# Patient Record
Sex: FEMALE | Race: WHITE | NOT HISPANIC OR LATINO | Employment: OTHER | URBAN - METROPOLITAN AREA
[De-identification: names, ages, dates, MRNs, and addresses within clinical notes are randomized per-mention and may not be internally consistent; named-entity substitution may affect disease eponyms.]

---

## 2017-04-14 PROBLEM — M84.374A METATARSAL STRESS FRACTURE OF RIGHT FOOT: Status: ACTIVE | Noted: 2017-04-14

## 2017-04-21 DIAGNOSIS — M84.374A METATARSAL STRESS FRACTURE OF RIGHT FOOT, INITIAL ENCOUNTER: Primary | ICD-10-CM

## 2017-04-25 ENCOUNTER — HOSPITAL ENCOUNTER (OUTPATIENT)
Dept: RADIOLOGY | Facility: HOSPITAL | Age: 37
Discharge: HOME | End: 2017-04-25
Attending: ORTHOPAEDIC SURGERY
Payer: MEDICAID

## 2017-04-25 ENCOUNTER — OFFICE VISIT (OUTPATIENT)
Dept: ORTHOPEDICS | Facility: CLINIC | Age: 37
End: 2017-04-25
Payer: MEDICAID

## 2017-04-25 VITALS
WEIGHT: 170 LBS | SYSTOLIC BLOOD PRESSURE: 128 MMHG | HEART RATE: 101 BPM | DIASTOLIC BLOOD PRESSURE: 85 MMHG | BODY MASS INDEX: 27.32 KG/M2 | HEIGHT: 66 IN

## 2017-04-25 DIAGNOSIS — M84.374A METATARSAL STRESS FRACTURE OF RIGHT FOOT, INITIAL ENCOUNTER: ICD-10-CM

## 2017-04-25 DIAGNOSIS — S92.351A CLOSED DISPLACED FRACTURE OF FIFTH METATARSAL BONE OF RIGHT FOOT, INITIAL ENCOUNTER: Primary | ICD-10-CM

## 2017-04-25 DIAGNOSIS — M84.375D STRESS FRACTURE OF LEFT FOOT WITH ROUTINE HEALING, SUBSEQUENT ENCOUNTER: Primary | ICD-10-CM

## 2017-04-25 PROCEDURE — 99999 PR PBB SHADOW E&M-EST. PATIENT-LVL III: CPT | Mod: PBBFAC,,, | Performed by: ORTHOPAEDIC SURGERY

## 2017-04-25 PROCEDURE — 73630 X-RAY EXAM OF FOOT: CPT | Mod: 26,RT,, | Performed by: RADIOLOGY

## 2017-04-25 PROCEDURE — 99213 OFFICE O/P EST LOW 20 MIN: CPT | Mod: PBBFAC,PO | Performed by: ORTHOPAEDIC SURGERY

## 2017-04-25 PROCEDURE — 99204 OFFICE O/P NEW MOD 45 MIN: CPT | Mod: S$PBB,,, | Performed by: ORTHOPAEDIC SURGERY

## 2017-04-25 RX ORDER — MUPIROCIN 20 MG/G
1 OINTMENT TOPICAL
Status: CANCELLED | OUTPATIENT
Start: 2017-04-25

## 2017-04-25 NOTE — LETTER
April 25, 2017      [place]  [address] [address]  [address]  [facility]  [facility] [address]           [facility]  [address] [facility] [address] [place] [address]  Phone: [phone]          Patient: Priscilla Wall   MR Number: [id number]   YOB: 1980   Date of Visit: 4/25/2017       Dear Dr. Vincent Hyatt II:    Thank you for referring Priscilla Wall to me for evaluation. Attached you will find relevant portions of my assessment and plan of care.    If you have questions, please do not hesitate to call me. I look forward to following Priscilla Wall along with you.    Sincerely,    Cecy Nix, LPN    Enclosure  CC:  No Recipients    If you would like to receive this communication electronically, please contact [email or web address] or [phone] to request more information on [facility] Link access.    For providers and/or their staff who would like to refer a patient to [facility], please contact us through our one-stop-shop provider referral line, [facility], at [phone].    If you feel you have received this communication in error or would no longer like to receive these types of communications, please e-mail [email or web address]

## 2017-04-25 NOTE — PROGRESS NOTES
"HPI: Priscilla Wall is a 36 y.o. female who complains of right foot pain. She was referred to me by Dr. Vincent Sanchez and was seen in consultation today. The pain began acutely on 4/5/17 when she stepped on a lawn decoration and twisted her foot. She felt and heard a pop. She was seen at urgent care and has been walking in a boot with crutches. She rate her pain as 2/10 today and worse with Weight bearing. She also has h/o previous stress frx on the left foot.      PAST MEDICAL/SURGICAL/FAMILY/SOCIAL/ HISTORY: REVIEWED    ALLERGIES/MEDICATIONS: REVIEWED       Review of Systems:     Constitution: Negative.   HEENT: Negative.   Eyes: Negative.   Cardiovascular: Negative.   Respiratory: Negative.   Endocrine: Negative.   Hematologic/Lymphatic: Negative.   Skin: Negative.   Musculoskeletal: Positive for right foot pain   Gastrointestinal: Negative.   Genitourinary: Negative.   Neurological: Negative.   Psychiatric/Behavioral: Negative.   Allergic/Immunologic: Negative.       PHYSICAL EXAM:  Vitals:    04/25/17 1503   BP: 128/85   Pulse: 101     Ht Readings from Last 1 Encounters:   04/25/17 5' 6" (1.676 m)     Wt Readings from Last 1 Encounters:   04/25/17 77.1 kg (170 lb)         GENERAL: Well developed, well nourished, no acute distress.  SKIN: Skin is intact. No atrophy, abrasions or lesions are noted.   Neurological: Normal mental status. Appropriate and conversant. Alert and oriented x 3.  GAIT: Walks with crutches.    Right lower extremity compared with LLE:  2+ dorsalis pedis pulse.  Capillary refill < 3 seconds.  Normal range of motion tibiotalar and subtalar joints. Pes cavovarus.   5/5 strength EHL, FHL, tibialis anterior, gastrocsoleus, tibialis posterior and peroneals. Sensation to light touch intact sural, saphenous, superficial peroneal and deep peroneal nerves. Mild swelling of the lateral foot, no ecchymosis or deformity. No lymphadenopathy, no masses or tumors palpated.   tenderness to palpation lateral " 5th metatarsal diaphysis.       XRAYS:   3 views of right foot obtained and reviewed today reveal martinez fracture of the right foot with minimal evidence of healing.       ASSESSMENT:        Right 5th metatarsal martinez fracture    PLAN:   I spent 20 minutes in consulation with the patient today. More than half the time was spent counseling the patient on her condition and the options for operative versus non-operative care.  I agree with Dr. Sanchez and I recommend open reduction internal fixation right 5th metatarsal frx with intra-medullary screw fixation and possible bone grafting. I have explained the procedure, including indications, risks, and alternatives.  Priscilla Wall gave informed consent and is medically ready for surgery. To OR 4/28/17 [facility].

## 2017-04-26 ENCOUNTER — HOSPITAL ENCOUNTER (OUTPATIENT)
Dept: RADIOLOGY | Facility: HOSPITAL | Age: 37
Discharge: HOME | End: 2017-04-26
Attending: ORTHOPAEDIC SURGERY | Admitting: ORTHOPAEDIC SURGERY
Payer: MEDICAID

## 2017-04-26 DIAGNOSIS — M84.375D STRESS FRACTURE OF LEFT FOOT WITH ROUTINE HEALING, SUBSEQUENT ENCOUNTER: ICD-10-CM

## 2017-04-26 PROCEDURE — 77080 DXA BONE DENSITY AXIAL: CPT | Mod: 26,,, | Performed by: RADIOLOGY

## 2017-04-26 PROCEDURE — 77080 DXA BONE DENSITY AXIAL: CPT | Mod: TC,PO

## 2017-04-27 ENCOUNTER — ANESTHESIA EVENT (OUTPATIENT)
Dept: SURGERY | Facility: HOSPITAL | Age: 37
End: 2017-04-27
Payer: MEDICAID

## 2017-04-28 ENCOUNTER — SURGERY (OUTPATIENT)
Age: 37
End: 2017-04-28

## 2017-04-28 ENCOUNTER — TELEPHONE (OUTPATIENT)
Dept: ORTHOPEDICS | Facility: CLINIC | Age: 37
End: 2017-04-28

## 2017-04-28 ENCOUNTER — HOSPITAL ENCOUNTER (OUTPATIENT)
Facility: HOSPITAL | Age: 37
Discharge: HOME | End: 2017-04-28
Attending: ORTHOPAEDIC SURGERY | Admitting: ORTHOPAEDIC SURGERY
Payer: MEDICAID

## 2017-04-28 ENCOUNTER — ANESTHESIA (OUTPATIENT)
Dept: SURGERY | Facility: HOSPITAL | Age: 37
End: 2017-04-28
Payer: MEDICAID

## 2017-04-28 ENCOUNTER — HOSPITAL ENCOUNTER (OUTPATIENT)
Dept: RADIOLOGY | Facility: HOSPITAL | Age: 37
Discharge: HOME | End: 2017-04-28
Attending: ORTHOPAEDIC SURGERY | Admitting: ORTHOPAEDIC SURGERY
Payer: MEDICAID

## 2017-04-28 VITALS
OXYGEN SATURATION: 97 % | RESPIRATION RATE: 16 BRPM | TEMPERATURE: 98 F | HEIGHT: 66 IN | BODY MASS INDEX: 28.93 KG/M2 | HEART RATE: 80 BPM | DIASTOLIC BLOOD PRESSURE: 82 MMHG | SYSTOLIC BLOOD PRESSURE: 132 MMHG | WEIGHT: 180 LBS

## 2017-04-28 DIAGNOSIS — S92.351A CLOSED DISPLACED FRACTURE OF FIFTH METATARSAL BONE OF RIGHT FOOT, INITIAL ENCOUNTER: ICD-10-CM

## 2017-04-28 DIAGNOSIS — S92.351A CLOSED DISPLACED FRACTURE OF FIFTH METATARSAL BONE OF RIGHT FOOT: ICD-10-CM

## 2017-04-28 DIAGNOSIS — S92.351A DISPLACED FRACTURE OF FIFTH METATARSAL BONE OF RIGHT FOOT: Primary | ICD-10-CM

## 2017-04-28 LAB
B-HCG UR QL: NEGATIVE
CTP QC/QA: YES

## 2017-04-28 PROCEDURE — 28485 OPTX METATARSAL FX EACH: CPT | Mod: RT,,, | Performed by: ORTHOPAEDIC SURGERY

## 2017-04-28 PROCEDURE — 63600175 PHARM REV CODE 636 W HCPCS: Mod: PO | Performed by: ANESTHESIOLOGY

## 2017-04-28 PROCEDURE — D9220A PRA ANESTHESIA: Mod: CRNA,,, | Performed by: NURSE ANESTHETIST, CERTIFIED REGISTERED

## 2017-04-28 PROCEDURE — 36000708 HC OR TIME LEV III 1ST 15 MIN: Mod: PO | Performed by: ORTHOPAEDIC SURGERY

## 2017-04-28 PROCEDURE — 73620 X-RAY EXAM OF FOOT: CPT | Mod: TC,PO,RT

## 2017-04-28 PROCEDURE — 25000003 PHARM REV CODE 250: Mod: PO | Performed by: ANESTHESIOLOGY

## 2017-04-28 PROCEDURE — 71000039 HC RECOVERY, EACH ADD'L HOUR: Mod: PO | Performed by: ORTHOPAEDIC SURGERY

## 2017-04-28 PROCEDURE — 71000033 HC RECOVERY, INTIAL HOUR: Mod: PO | Performed by: ORTHOPAEDIC SURGERY

## 2017-04-28 PROCEDURE — 63600175 PHARM REV CODE 636 W HCPCS: Mod: PO | Performed by: NURSE ANESTHETIST, CERTIFIED REGISTERED

## 2017-04-28 PROCEDURE — 37000008 HC ANESTHESIA 1ST 15 MINUTES: Mod: PO | Performed by: ORTHOPAEDIC SURGERY

## 2017-04-28 PROCEDURE — 37000009 HC ANESTHESIA EA ADD 15 MINS: Mod: PO | Performed by: ORTHOPAEDIC SURGERY

## 2017-04-28 PROCEDURE — 25000003 PHARM REV CODE 250: Mod: PO | Performed by: ORTHOPAEDIC SURGERY

## 2017-04-28 PROCEDURE — 76000 FLUOROSCOPY <1 HR PHYS/QHP: CPT | Mod: TC,PO

## 2017-04-28 PROCEDURE — 76000 FLUOROSCOPY <1 HR PHYS/QHP: CPT | Mod: 26,,, | Performed by: RADIOLOGY

## 2017-04-28 PROCEDURE — 73620 X-RAY EXAM OF FOOT: CPT | Mod: 26,RT,, | Performed by: RADIOLOGY

## 2017-04-28 PROCEDURE — C1713 ANCHOR/SCREW BN/BN,TIS/BN: HCPCS | Mod: PO | Performed by: ORTHOPAEDIC SURGERY

## 2017-04-28 PROCEDURE — 81025 URINE PREGNANCY TEST: CPT | Mod: PO | Performed by: ORTHOPAEDIC SURGERY

## 2017-04-28 PROCEDURE — 25000003 PHARM REV CODE 250: Mod: PO | Performed by: NURSE ANESTHETIST, CERTIFIED REGISTERED

## 2017-04-28 PROCEDURE — D9220A PRA ANESTHESIA: Mod: ANES,,, | Performed by: ANESTHESIOLOGY

## 2017-04-28 PROCEDURE — 27201423 OPTIME MED/SURG SUP & DEVICES STERILE SUPPLY: Mod: PO | Performed by: ORTHOPAEDIC SURGERY

## 2017-04-28 PROCEDURE — 36000709 HC OR TIME LEV III EA ADD 15 MIN: Mod: PO | Performed by: ORTHOPAEDIC SURGERY

## 2017-04-28 PROCEDURE — 63600175 PHARM REV CODE 636 W HCPCS: Mod: PO | Performed by: ORTHOPAEDIC SURGERY

## 2017-04-28 RX ORDER — LIDOCAINE HYDROCHLORIDE 10 MG/ML
1 INJECTION, SOLUTION EPIDURAL; INFILTRATION; INTRACAUDAL; PERINEURAL ONCE
Status: DISCONTINUED | OUTPATIENT
Start: 2017-04-28 | End: 2017-04-28 | Stop reason: HOSPADM

## 2017-04-28 RX ORDER — KETAMINE HYDROCHLORIDE 100 MG/ML
INJECTION, SOLUTION INTRAMUSCULAR; INTRAVENOUS
Status: DISCONTINUED | OUTPATIENT
Start: 2017-04-28 | End: 2017-04-28

## 2017-04-28 RX ORDER — SODIUM CHLORIDE, SODIUM LACTATE, POTASSIUM CHLORIDE, CALCIUM CHLORIDE 600; 310; 30; 20 MG/100ML; MG/100ML; MG/100ML; MG/100ML
INJECTION, SOLUTION INTRAVENOUS CONTINUOUS
Status: DISCONTINUED | OUTPATIENT
Start: 2017-04-28 | End: 2017-04-28 | Stop reason: HOSPADM

## 2017-04-28 RX ORDER — BUPIVACAINE HYDROCHLORIDE 5 MG/ML
INJECTION, SOLUTION EPIDURAL; INTRACAUDAL
Status: DISCONTINUED | OUTPATIENT
Start: 2017-04-28 | End: 2017-04-28 | Stop reason: HOSPADM

## 2017-04-28 RX ORDER — OXYCODONE AND ACETAMINOPHEN 10; 325 MG/1; MG/1
1 TABLET ORAL EVERY 4 HOURS PRN
Qty: 75 TABLET | Refills: 0 | Status: SHIPPED | OUTPATIENT
Start: 2017-04-28 | End: 2017-06-12 | Stop reason: SDUPTHER

## 2017-04-28 RX ORDER — ONDANSETRON 2 MG/ML
INJECTION INTRAMUSCULAR; INTRAVENOUS
Status: DISCONTINUED | OUTPATIENT
Start: 2017-04-28 | End: 2017-04-28

## 2017-04-28 RX ORDER — GLYCOPYRROLATE 0.2 MG/ML
INJECTION INTRAMUSCULAR; INTRAVENOUS
Status: DISCONTINUED | OUTPATIENT
Start: 2017-04-28 | End: 2017-04-28

## 2017-04-28 RX ORDER — LIDOCAINE HYDROCHLORIDE 10 MG/ML
INJECTION, SOLUTION EPIDURAL; INFILTRATION; INTRACAUDAL; PERINEURAL
Status: DISCONTINUED | OUTPATIENT
Start: 2017-04-28 | End: 2017-04-28 | Stop reason: HOSPADM

## 2017-04-28 RX ORDER — FENTANYL CITRATE 50 UG/ML
25 INJECTION, SOLUTION INTRAMUSCULAR; INTRAVENOUS EVERY 5 MIN PRN
Status: COMPLETED | OUTPATIENT
Start: 2017-04-28 | End: 2017-04-28

## 2017-04-28 RX ORDER — OXYCODONE HYDROCHLORIDE 5 MG/1
5 TABLET ORAL ONCE AS NEEDED
Status: COMPLETED | OUTPATIENT
Start: 2017-04-28 | End: 2017-04-28

## 2017-04-28 RX ORDER — MUPIROCIN 20 MG/G
1 OINTMENT TOPICAL
Status: COMPLETED | OUTPATIENT
Start: 2017-04-28 | End: 2017-04-28

## 2017-04-28 RX ORDER — SODIUM CHLORIDE 0.9 % (FLUSH) 0.9 %
3 SYRINGE (ML) INJECTION
Status: DISCONTINUED | OUTPATIENT
Start: 2017-04-28 | End: 2017-04-28 | Stop reason: HOSPADM

## 2017-04-28 RX ORDER — ONDANSETRON 4 MG/1
8 TABLET, ORALLY DISINTEGRATING ORAL EVERY 8 HOURS PRN
Qty: 20 TABLET | Refills: 1 | Status: ON HOLD | OUTPATIENT
Start: 2017-04-28 | End: 2017-05-31

## 2017-04-28 RX ORDER — PROPOFOL 10 MG/ML
VIAL (ML) INTRAVENOUS
Status: DISCONTINUED | OUTPATIENT
Start: 2017-04-28 | End: 2017-04-28

## 2017-04-28 RX ORDER — MIDAZOLAM HYDROCHLORIDE 1 MG/ML
INJECTION, SOLUTION INTRAMUSCULAR; INTRAVENOUS
Status: DISCONTINUED | OUTPATIENT
Start: 2017-04-28 | End: 2017-04-28

## 2017-04-28 RX ORDER — FENTANYL CITRATE 50 UG/ML
INJECTION, SOLUTION INTRAMUSCULAR; INTRAVENOUS
Status: DISCONTINUED | OUTPATIENT
Start: 2017-04-28 | End: 2017-04-28

## 2017-04-28 RX ORDER — KETOROLAC TROMETHAMINE 30 MG/ML
INJECTION, SOLUTION INTRAMUSCULAR; INTRAVENOUS
Status: DISCONTINUED | OUTPATIENT
Start: 2017-04-28 | End: 2017-04-28

## 2017-04-28 RX ORDER — DEXAMETHASONE SODIUM PHOSPHATE 4 MG/ML
8 INJECTION, SOLUTION INTRA-ARTICULAR; INTRALESIONAL; INTRAMUSCULAR; INTRAVENOUS; SOFT TISSUE
Status: COMPLETED | OUTPATIENT
Start: 2017-04-28 | End: 2017-04-28

## 2017-04-28 RX ORDER — LIDOCAINE HCL/PF 100 MG/5ML
SYRINGE (ML) INTRAVENOUS
Status: DISCONTINUED | OUTPATIENT
Start: 2017-04-28 | End: 2017-04-28

## 2017-04-28 RX ORDER — ACETAMINOPHEN 10 MG/ML
INJECTION, SOLUTION INTRAVENOUS
Status: DISCONTINUED | OUTPATIENT
Start: 2017-04-28 | End: 2017-04-28

## 2017-04-28 RX ADMIN — ONDANSETRON 4 MG: 2 INJECTION, SOLUTION INTRAMUSCULAR; INTRAVENOUS at 10:04

## 2017-04-28 RX ADMIN — FENTANYL CITRATE 25 MCG: 50 INJECTION INTRAMUSCULAR; INTRAVENOUS at 12:04

## 2017-04-28 RX ADMIN — SODIUM CHLORIDE, SODIUM LACTATE, POTASSIUM CHLORIDE, AND CALCIUM CHLORIDE: .6; .31; .03; .02 INJECTION, SOLUTION INTRAVENOUS at 09:04

## 2017-04-28 RX ADMIN — FENTANYL CITRATE 25 MCG: 50 INJECTION INTRAMUSCULAR; INTRAVENOUS at 01:04

## 2017-04-28 RX ADMIN — LIDOCAINE HYDROCHLORIDE 75 MG: 20 INJECTION PARENTERAL at 10:04

## 2017-04-28 RX ADMIN — PROPOFOL 40 MG: 10 INJECTION, EMULSION INTRAVENOUS at 10:04

## 2017-04-28 RX ADMIN — KETAMINE HYDROCHLORIDE 25 MG: 100 INJECTION, SOLUTION, CONCENTRATE INTRAMUSCULAR; INTRAVENOUS at 10:04

## 2017-04-28 RX ADMIN — PROPOFOL 40 MG: 10 INJECTION, EMULSION INTRAVENOUS at 11:04

## 2017-04-28 RX ADMIN — MUPIROCIN 1 G: 20 OINTMENT TOPICAL at 09:04

## 2017-04-28 RX ADMIN — FENTANYL CITRATE 50 MCG: 50 INJECTION, SOLUTION INTRAMUSCULAR; INTRAVENOUS at 11:04

## 2017-04-28 RX ADMIN — DEXAMETHASONE SODIUM PHOSPHATE 8 MG: 4 INJECTION, SOLUTION INTRAMUSCULAR; INTRAVENOUS at 09:04

## 2017-04-28 RX ADMIN — FENTANYL CITRATE 50 MCG: 50 INJECTION, SOLUTION INTRAMUSCULAR; INTRAVENOUS at 10:04

## 2017-04-28 RX ADMIN — KETOROLAC TROMETHAMINE 30 MG: 30 INJECTION, SOLUTION INTRAMUSCULAR; INTRAVENOUS at 10:04

## 2017-04-28 RX ADMIN — BUPIVACAINE HYDROCHLORIDE 30 ML: 5 INJECTION, SOLUTION EPIDURAL; INTRACAUDAL; PERINEURAL at 11:04

## 2017-04-28 RX ADMIN — MIDAZOLAM HYDROCHLORIDE 2 MG: 1 INJECTION, SOLUTION INTRAMUSCULAR; INTRAVENOUS at 10:04

## 2017-04-28 RX ADMIN — LIDOCAINE HYDROCHLORIDE 20 ML: 10 INJECTION, SOLUTION EPIDURAL; INFILTRATION; INTRACAUDAL; PERINEURAL at 11:04

## 2017-04-28 RX ADMIN — OXYCODONE HYDROCHLORIDE 5 MG: 5 TABLET ORAL at 12:04

## 2017-04-28 RX ADMIN — GLYCOPYRROLATE 0.2 MG: 0.2 INJECTION, SOLUTION INTRAMUSCULAR; INTRAVENOUS at 10:04

## 2017-04-28 RX ADMIN — DEXTROSE 2 G: 50 INJECTION, SOLUTION INTRAVENOUS at 10:04

## 2017-04-28 RX ADMIN — SODIUM CHLORIDE, SODIUM LACTATE, POTASSIUM CHLORIDE, AND CALCIUM CHLORIDE: .6; .31; .03; .02 INJECTION, SOLUTION INTRAVENOUS at 12:04

## 2017-04-28 RX ADMIN — PROPOFOL 200 MG: 10 INJECTION, EMULSION INTRAVENOUS at 10:04

## 2017-04-28 RX ADMIN — ACETAMINOPHEN 1000 MG: 10 INJECTION, SOLUTION INTRAVENOUS at 10:04

## 2017-04-28 NOTE — OP NOTE
OPERATIVE NOTE       DATE: 4/12/2017  TIME: 12:03 PM       PATIENT NAME: Anaya Fuller      PRE-OPERATIVE DIAGNOSIS: Right 5th metatarsal martinez fracture       POST-OPERATIVE DIAGNOSIS: Right 5th metatarsal martinez fracture      PROCEDURE: Open reduction internal fixation Right 5th metatarsal martinez fracture       ANESTHESIA TYPE: LMA and ankle block      SPECIMENS SENT: None       COMPLICATIONS: None       BLOOD LOSS: <5 cc       SURGEON: Richard Osborn MD       ASSISTANT: SOLO Obregon      Procedure in detail: After appropriate informed consent was obtained the patient was taken to the OR and placed in the lazy lateral position on the bean bag. The Right lower extremity was prepped and draped in the usual sterile fashion. A guidewire for the Arthrex martinez fracture set was placed percutaneously. There was good position of the wire within the intramedullary canal of the metatarsal under fluoro. However the medial cortex was sclerotic and the screw was going out the lateral cortex therefore a guidewire for the [employer]ynthes 4.5 cannulated screws was used and appropriated position within the canal was confirmed.  Next a 2 -cm incision was made at the base of the 5th metatarsal in line with the guidewire using a #15 blade overlying the metatarsal.. The depth was measured and the guidewire was overdrilled. Next a 50 mm 4.5 cannulated screw with a washer was placed over the guidewire with good reduction and compression of the fracture. There was good reduction and compression of the fracture on AP/Lateral/Oblique views of the foot under fluoroscopy.       The incision was irrigated with normal saline. The skin was re-approximated using 3.0 nylon interrupted suture. Sterile dressing using xeroform, bacitracin, 4x8s, cast padding, posterior splint and stirrups  wrap were placed. Patient tolerated the procedure well without complications. I was present and scrubbed for the entire case.

## 2017-04-28 NOTE — DISCHARGE SUMMARY
"[facility]  Discharge Note  Short Stay    Admit Date: 4/28/2017    Discharge Date and Time:  4/28/2017 11:57 AM     Attending Physician: Richard Osborn MD     Discharge Provider: Richard Osborn    Diagnoses:  Active Hospital Problems    Diagnosis  POA    *Displaced fracture of fifth metatarsal bone of right foot [S92.351A]  Yes      Resolved Hospital Problems    Diagnosis Date Resolved POA   No resolved problems to display.       Discharged Condition: good    Hospital Course: Patient was admitted for an outpatient procedure and tolerated the procedure well with no complications.    Final Diagnoses: Same as principal problem.    Disposition: Home or Self Care    Follow up/Patient Instructions:    Medications:  Reconciled Home Medications:   Current Discharge Medication List      START taking these medications    Details   ondansetron (ZOFRAN-ODT) 4 MG TbDL Take 2 tablets (8 mg total) by mouth every 8 (eight) hours as needed.  Qty: 20 tablet, Refills: 1      oxycodone-acetaminophen (PERCOCET)  mg per tablet Take 1 tablet by mouth every 4 (four) hours as needed for Pain.  Qty: 75 tablet, Refills: 0         CONTINUE these medications which have NOT CHANGED    Details   citalopram (CELEXA) 40 MG tablet TK 1 T PO Q NIGHT  Refills: 2      hydrocodone-acetaminophen 5-325mg (NORCO) 5-325 mg per tablet Take 1 tablet by mouth every 8 (eight) hours as needed for Pain.  Qty: 20 tablet, Refills: 0    Associated Diagnoses: Metatarsal stress fracture of right foot, initial encounter      oxycodone-acetaminophen (PERCOCET) 5-325 mg per tablet TK 1 T PO TID PRN P.  Refills: 0      VITAMIN B COMPLEX (VITAMINS B COMPLEX ORAL) Take 1 tablet by mouth once daily.         STOP taking these medications       ibuprofen (ADVIL,MOTRIN) 800 MG tablet Comments:   Reason for Stopping:               Discharge Procedure Orders  CRUTCHES FOR HOME USE   Order Specific Question Answer Comments   Type: Axillary    Height: 5' 6" " "(1.676 m)    Weight: 81.6 kg (180 lb)    Length of need (1-99 months): 3      Diet general     Call MD for:  temperature >100.4     Call MD for:  persistent nausea and vomiting     Call MD for:  severe uncontrolled pain     Call MD for:  difficulty breathing, headache or visual disturbances     Call MD for:  redness, tenderness, or signs of infection (pain, swelling, redness, odor or green/yellow discharge around incision site)     Call MD for:  hives     Call MD for:  persistent dizziness or light-headedness     Call MD for:  extreme fatigue     Keep surgical extremity elevated     No driving, operating heavy equipment or signing legal documents while taking pain medication     Non weight bearing     Leave dressing on - Keep it clean, dry, and intact until clinic visit       Follow-up Information     Follow up with Richard Osborn MD On 5/9/2017.    Specialty:  Orthopedic Surgery    Contact information:    [address] [facility] [address] [place] [address]  [phone]            Discharge Procedure Orders (must include Diet, Follow-up, Activity):    Discharge Procedure Orders (must include Diet, Follow-up, Activity)  CRUTCHES FOR HOME USE   Order Specific Question Answer Comments   Type: Axillary    Height: 5' 6" (1.676 m)    Weight: 81.6 kg (180 lb)    Length of need (1-99 months): 3      Diet general     Call MD for:  temperature >100.4     Call MD for:  persistent nausea and vomiting     Call MD for:  severe uncontrolled pain     Call MD for:  difficulty breathing, headache or visual disturbances     Call MD for:  redness, tenderness, or signs of infection (pain, swelling, redness, odor or green/yellow discharge around incision site)     Call MD for:  hives     Call MD for:  persistent dizziness or light-headedness     Call MD for:  extreme fatigue     Keep surgical extremity elevated     No driving, operating heavy equipment or signing legal documents while taking pain medication     Non weight bearing     Leave " dressing on - Keep it clean, dry, and intact until clinic visit

## 2017-04-28 NOTE — TELEPHONE ENCOUNTER
----- Message from Reuben Pineda sent at 4/28/2017  3:36 PM CDT -----  Contact: self [phone]  Pt states that she is having issues filling her pain medication after surgery today/ she is needing a physician to override this at the pharmacy/ please call thanks

## 2017-04-28 NOTE — TRANSFER OF CARE
"Anesthesia Transfer of Care Note    Patient: Priscilla Wall    Procedure(s) Performed: Procedure(s) (LRB):  OPEN REDUCTION INTERNAL FIXATION-METATARSALS (Right)    Patient location: PACU    Anesthesia Type: general    Transport from OR: Transported from OR on room air with adequate spontaneous ventilation    Post pain: adequate analgesia    Post assessment: no apparent anesthetic complications and tolerated procedure well    Post vital signs: stable    Level of consciousness: sedated    Nausea/Vomiting: no nausea/vomiting    Complications: none          Last vitals:   Visit Vitals    /72 (BP Location: Right arm, Patient Position: Lying, BP Method: Automatic)    Pulse 87    Temp 36.7 °C (98.1 °F) (Temporal)    Resp 16    Ht 5' 6" (1.676 m)    Wt 81.6 kg (180 lb)    LMP 04/14/2017    SpO2 96%    Breastfeeding No    BMI 29.05 kg/m2     "

## 2017-04-28 NOTE — OR NURSING
Pt notified that procedure will be delayed due to previous surgeon's case running 45-60 minutes late. Pt informed that she will be updated if any further delays occur. Pt states complete understanding.

## 2017-04-28 NOTE — TELEPHONE ENCOUNTER
Spoke to patient. Pharmacy needs PA to dispense medication. Called pharmacy. Pharmacist states that the patient's insurance only allows patient's to have 2 prescriptions for narcotics a month. Patient has had 2 already this month. Therefore, insurance company will need to be contacted to initiate PA. In the mean time patient can pay cash price of $170 and attempt to be reimbursed for the medication. Called patient back to notify of this. Patient has pain medication left over from last prescription fill and will use this through the weekend until we get authorization for pain medication. Call placed to insurance company to initiate PA. Company is faxing over forms to be filled out.

## 2017-04-28 NOTE — BRIEF OP NOTE
OPERATIVE NOTE       DATE: 4/12/2017  TIME: 12:02 PM       PATIENT NAME: Anaya Fuller      PRE-OPERATIVE DIAGNOSIS: Right 5th metatarsal martinez fracture       POST-OPERATIVE DIAGNOSIS: Right 5th metatarsal martinez fracture      PROCEDURE: Open reduction internal fixation Right 5th metatarsal martinez fracture       ANESTHESIA TYPE: LMA and ankle block      SPECIMENS SENT: None       COMPLICATIONS: None       BLOOD LOSS: <5 cc       SURGEON: Richard Osborn MD       ASSISTANT: SOLO Obregon

## 2017-04-28 NOTE — DISCHARGE INSTRUCTIONS
FOOT SURGERY  After surgery:    DOS:   Keep leg elevated until first post operative visit   Keep dressing clean and dry DO NOT CHANGE BANDAGES   Advanced diet as tolerated.    Check circulation frequently in toes by pressing down on toenail. Nail should turn white and then pink when released.   Resume home medications    DONT:   Do not remove your dressing   Do not get dressing wet.   No driving until released by MD   DO NOT TAKE ADDITIONAL TYLENOL/ACETAMINOPHEN WHILE TAKING NARCOTIC PAIN MEDICATION THAT CONTAINS TYLENOL/ACETAMINOPHEN.    CALL PHYSICIAN FOR:   Pain, burning, or numbness of the toes not relieved by elevation of the leg.   Pale or cold toes; bluish nail beds.   Redness, swelling, or bleeding.   Fever> 101   Drainage (pus) from the puncture sites   Pain unrelieved by pain medication    FOR EMERGENCIES CONTACT YOUR PHYSICIAN -[phone]      Discharge Instructions: Using Crutches (Non-Weight-Bearing)  Your healthcare provider has prescribed crutches for you. A healthy leg can support your body weight, but when you have an injured leg or foot, you need to keep weight off it. The swing to method of walking, sometimes called gait, is easy to learn and takes less arm strength and balance. The swing through gait takes more practice, but it moves you farther with each step and is less tiring overall. Start with swing to and progress to swing through when instructed.      Before you use crutches  Be prepared:  · Remove throw rugs, electrical cords, and anything else that may cause you to fall.  · Arrange your household to keep the items you need handy. Keep everything else out of the way.  · Find a backpack, cammy pack, or apron, or use pockets to carry things. This will help you keep your hands free.  Standing with crutches  Use the balanced standing (tripod) position when you start or end a movement. Also use it whenever youre standing for a length of time.  · Move your  crutches in front of you about 12 inches.  · Hold the injured (or weaker) foot off the floor.  · Find your balance.  · Be sure not to rest your armpits on the pads of the crutches.  Walking with crutches  Tips include the following:   · Start in a balanced standing (tripod) position.  · Squeeze the crutch pads against the sides of your chest.  · The bottom tips of the crutches should be wide enough apart for you to move easily between them.  · Support your weight on your hands, not on your armpits.  Swing to gait  · With your crutches in front of you, press down on the handgrips.  · Lift your good (stronger) foot and swing your body up to the crutches.  · Land on your good foot, between your crutches.  · Keep the knee of your injured or weaker foot slightly bent.  · Reach forward and out with the crutches to begin the next step.  Swing through gait  · With your crutches in front of you, press down on the handgrips.  · Lift your good (stronger) foot and swing your body through the crutches.  · Land on your good foot, about 12 inches in front of the crutches.  · Keep the knee of your injured leg slightly bent.  · Reach forward and out with the crutches to begin the next step.  Follow-up  Make a follow-up appointment as directed by your healthcare provider.     When to call your healthcare provider  Call your healthcare provider right away if you have any of the following:  · Sudden or increased shortness of breath  · Sudden chest pain  · Fever above 100.4°F (38.0°C)  · Increasing redness, tenderness, or swelling at the incision site or in the injured limb  · Drainage from the incision or injured limb  · Opening of the incision or injury  · Localized chest pain with coughing   Date Last Reviewed: 8/1/2016  © 1430-4806 [employer]. [address] [place] [address], [place] [address]. All rights reserved. This information is not intended as a substitute for professional medical care. Always follow your healthcare  professional's instructions.      Discharge Instructions: After Your Surgery  Youve just had surgery. During surgery you were given medicine called anesthesia to keep you relaxed and free of pain. After surgery you may have some pain or nausea. This is common. Here are some tips for feeling better and getting well after surgery.     Stay on schedule with your medication.   Going home  Your doctor or nurse will show you how to take care of yourself when you go home. He or she will also answer your questions. Have an adult family member or friend drive you home. For the first 24 hours after your surgery:  · Do not drive or use heavy equipment.  · Do not make important decisions or sign legal papers.  · Do not drink alcohol.  · Have someone stay with you, if needed. He or she can watch for problems and help keep you safe.  Be sure to go to all follow-up visits with your doctor. And rest after your surgery for as long as your doctor tells you to.  Coping with pain  If you have pain after surgery, pain medicine will help you feel better. Take it as told, before pain becomes severe. Also, ask your doctor or pharmacist about other ways to control pain. This might be with heat, ice, or relaxation. And follow any other instructions your surgeon or nurse gives you.  Tips for taking pain medicine  To get the best relief possible, remember these points:  · Pain medicines can upset your stomach. Taking them with a little food may help.  · Most pain relievers taken by mouth need at least 20 to 30 minutes to start to work.  · Taking medicine on a schedule can help you remember to take it. Try to time your medicine so that you can take it before starting an activity. This might be before you get dressed, go for a walk, or sit down for dinner.  · Constipation is a common side effect of pain medicines. Call your doctor before taking any medicines such as laxatives or stool softeners to help ease constipation. Also ask if you should  skip any foods. Drinking lots of fluids and eating foods such as fruits and vegetables that are high in fiber can also help. Remember, do not take laxatives unless your surgeon has prescribed them.  · Drinking alcohol and taking pain medicine can cause dizziness and slow your breathing. It can even be deadly. Do not drink alcohol while taking pain medicine.  · Pain medicine can make you react more slowly to things. Do not drive or run machinery while taking pain medicine.  Your health care provider may tell you to take acetaminophen to help ease your pain. Ask him or her how much you are supposed to take each day. Acetaminophen or other pain relievers may interact with your prescription medicines or other over-the-counter (OTC) drugs. Some prescription medicines have acetaminophen and other ingredients. Using both prescription and OTC acetaminophen for pain can cause you to overdose. Read the labels on your OTC medicines with care. This will help you to clearly know the list of ingredients, how much to take, and any warnings. It may also help you not take too much acetaminophen. If you have questions or do not understand the information, ask your pharmacist or health care provider to explain it to you before you take the OTC medicine.  Managing nausea  Some people have an upset stomach after surgery. This is often because of anesthesia, pain, or pain medicine, or the stress of surgery. These tips will help you handle nausea and eat healthy foods as you get better. If you were on a special food plan before surgery, ask your doctor if you should follow it while you get better. These tips may help:  · Do not push yourself to eat. Your body will tell you when to eat and how much.  · Start off with clear liquids and soup. They are easier to digest.  · Next try semi-solid foods, such as mashed potatoes, applesauce, and gelatin, as you feel ready.  · Slowly move to solid foods. Dont eat fatty, rich, or spicy foods at  first.  · Do not force yourself to have 3 large meals a day. Instead eat smaller amounts more often.  · Take pain medicines with a small amount of solid food, such as crackers or toast, to avoid nausea.     Call your surgeon if  · You still have pain an hour after taking medicine. The medicine may not be strong enough.  · You feel too sleepy, dizzy, or groggy. The medicine may be too strong.  · You have side effects like nausea, vomiting, or skin changes, such as rash, itching, or hives.       If you have obstructive sleep apnea  You were given anesthesia medicine during surgery to keep you comfortable and free of pain. After surgery, you may have more apnea spells because of this medicine and other medicines you were given. The spells may last longer than usual.   At home:  · Keep using the continuous positive airway pressure (CPAP) device when you sleep. Unless your health care provider tells you not to, use it when you sleep, day or night. CPAP is a common device used to treat obstructive sleep apnea.  · Talk with your provider before taking any pain medicine, muscle relaxants, or sedatives. Your provider will tell you about the possible dangers of taking these medicines.  Date Last Reviewed: 10/16/2014  © 5320-1836 [employer]. [address] [place] [address], [place] [address]. All rights reserved. This information is not intended as a substitute for professional medical care. Always follow your healthcare professional's instructions.

## 2017-04-28 NOTE — PLAN OF CARE
Problem: Surgery Nonspecified (Adult)  Goal: Signs and Symptoms of Listed Potential Problems Will be Absent, Minimized or Managed (Surgery Nonspecified)  Signs and symptoms of listed potential problems will be absent, minimized or managed by discharge/transition of care (reference Surgery Nonspecified (Adult) CPG).   Outcome: Outcome(s) achieved Date Met:  04/28/17  Vss, tootie po fluids, denies pain, ambulates easily.  States ready to go home.  Discharged from facility with family.

## 2017-04-28 NOTE — ANESTHESIA POSTPROCEDURE EVALUATION
"Anesthesia Post Evaluation    Patient: Priscilla Wall    Procedure(s) Performed: Procedure(s) (LRB):  OPEN REDUCTION INTERNAL FIXATION-METATARSALS (Right)    Final Anesthesia Type: general  Patient location during evaluation: PACU  Patient participation: Yes- Able to Participate  Level of consciousness: awake and alert and oriented  Post-procedure vital signs: reviewed and stable  Pain management: adequate  Airway patency: patent  PONV status at discharge: No PONV  Anesthetic complications: no      Cardiovascular status: hemodynamically stable  Respiratory status: unassisted, spontaneous ventilation and room air  Hydration status: euvolemic  Follow-up not needed.        Visit Vitals    /82    Pulse 80    Temp 36.7 °C (98.1 °F) (Skin)    Resp 16    Ht 5' 6" (1.676 m)    Wt 81.6 kg (180 lb)    LMP 04/14/2017    SpO2 97%    Breastfeeding No    BMI 29.05 kg/m2       Pain/Blayne Score: Pain Assessment Performed: Yes (4/28/2017 12:54 PM)  Presence of Pain: complains of pain/discomfort (4/28/2017 12:54 PM)  Pain Rating Prior to Med Admin: 8 (4/28/2017  1:16 PM)  Pain Rating Post Med Admin: 7 (4/28/2017  1:11 PM)  Blayne Score: 10 (4/28/2017 12:54 PM)      "

## 2017-04-28 NOTE — IP AVS SNAPSHOT
[facility]  [address]  [place] [address]  Phone: [phone]           Patient Discharge Instructions   Our goal is to set you up for success. This packet includes information on your condition, medications, and your home care.  It will help you care for yourself to prevent having to return to the hospital.     Please ask your nurse if you have any questions.      There are many details to remember when preparing to leave the hospital. Here is what you will need to do:    1. Take your medicine. If you are prescribed medications, review your Medication List on the following pages. You may have new medications to  at the pharmacy and others that you'll need to stop taking. Review the instructions for how and when to take your medications. Talk with your doctor or nurses if you are unsure of what to do.     2. Go to your follow-up appointments. Specific follow-up information is listed in the following pages. Your may be contacted by a nurse or clinical provider about future appointments. Be sure we have all of the phone numbers to reach you. Please contact your provider's office if you are unable to make an appointment.     3. Watch for warning signs. Your doctor or nurse will give you detailed warning signs to watch for and when to call for assistance. These instructions may also include educational information about your condition. If you experience any of warning signs to your health, call your doctor.           [facility]  Unless otherwise directed by your provider, please   contact [facility], our nurse care line   that is available for 24/7 assistance.     [phone] (toll-free)     Registered nurses in the [facility]   provide: appointment scheduling, clinical advisement, health education, and other advisory services.                  ** Verify the list of medication(s) below is accurate and up to date. Carry this with you in case of  emergency. If your medications have changed, please notify your healthcare provider.             Medication List      START taking these medications        Additional Info                      ondansetron 4 MG Tbdl   Commonly known as:  ZOFRAN-ODT   Quantity:  20 tablet   Refills:  1   Dose:  8 mg    Instructions:  Take 2 tablets (8 mg total) by mouth every 8 (eight) hours as needed.     Begin Date    AM    Noon    PM    Bedtime         CHANGE how you take these medications        Additional Info                      * oxycodone-acetaminophen 5-325 mg per tablet   Commonly known as:  PERCOCET   Refills:  0   What changed:  Another medication with the same name was added. Make sure you understand how and when to take each.    Instructions:  TK 1 T PO TID PRN P.     Begin Date    AM    Noon    PM    Bedtime       * oxycodone-acetaminophen  mg per tablet   Commonly known as:  PERCOCET   Quantity:  75 tablet   Refills:  0   Dose:  1 tablet   What changed:  You were already taking a medication with the same name, and this prescription was added. Make sure you understand how and when to take each.    Instructions:  Take 1 tablet by mouth every 4 (four) hours as needed for Pain.     Begin Date    AM    Noon    PM    Bedtime       * Notice:  This list has 2 medication(s) that are the same as other medications prescribed for you. Read the directions carefully, and ask your doctor or other care provider to review them with you.      CONTINUE taking these medications        Additional Info                      citalopram 40 MG tablet   Commonly known as:  CELEXA   Refills:  2    Instructions:  TK 1 T PO Q NIGHT     Begin Date    AM    Noon    PM    Bedtime       hydrocodone-acetaminophen 5-325mg 5-325 mg per tablet   Commonly known as:  NORCO   Quantity:  20 tablet   Refills:  0   Dose:  1 tablet    Instructions:  Take 1 tablet by mouth every 8 (eight) hours as needed for Pain.     Begin Date    AM    Noon    PM     Bedtime       VITAMINS B COMPLEX ORAL   Refills:  0   Dose:  1 tablet    Instructions:  Take 1 tablet by mouth once daily.     Begin Date    AM    Noon    PM    Bedtime         STOP taking these medications     ibuprofen 800 MG tablet   Commonly known as:  ADVIL,MOTRIN            Where to Get Your Medications      These medications were sent to [facility] [id number] [facility] [address] & [address]  [address] [facility] [address], [facility] [address]    Hours:  24-hours Phone:  [phone]     ondansetron 4 MG Tbdl    oxycodone-acetaminophen  mg per tablet                  Please bring to all follow up appointments:    1. A copy of your discharge instructions.  2. All medicines you are currently taking in their original bottles.  3. Identification and insurance card.    Please arrive 15 minutes ahead of scheduled appointment time.    Please call 24 hours in advance if you must reschedule your appointment and/or time.        Your Scheduled Appointments     May 16, 2017  9:15 AM CDT   Post OP with Richard Osborn MD   [facility] ([facility] [place]    [address] [facility] [place] [id number] [phone]              Follow-up Information     Follow up with Richard Osborn MD On 5/9/2017.    Specialty:  Orthopedic Surgery    Contact information:    [address] [place] [address]  [phone]          Discharge Instructions     Future Orders    Call MD for:  difficulty breathing, headache or visual disturbances     Call MD for:  extreme fatigue     Call MD for:  hives     Call MD for:  persistent dizziness or light-headedness     Call MD for:  persistent nausea and vomiting     Call MD for:  redness, tenderness, or signs of infection (pain, swelling, redness, odor or green/yellow discharge around incision site)     Call MD for:  severe uncontrolled pain     Call MD for:  temperature >100.4     CRUTCHES FOR HOME USE     Questions:    Type:  Axillary     "Height:  5' 6" (1.676 m)    Weight:  81.6 kg (180 lb)    Does patient have medical equipment at home?:      Other:      Length of need (1-99 months):  3    Diet general     Questions:    Total calories:      Fat restriction, if any:      Protein restriction, if any:      Na restriction, if any:      Fluid restriction:      Additional restrictions:      Keep surgical extremity elevated     Leave dressing on - Keep it clean, dry, and intact until clinic visit     No driving, operating heavy equipment or signing legal documents while taking pain medication     Non weight bearing         Discharge Instructions         FOOT SURGERY  After surgery:    DOS:   Keep leg elevated until first post operative visit   Keep dressing clean and dry DO NOT CHANGE BANDAGES   Advanced diet as tolerated.    Check circulation frequently in toes by pressing down on toenail. Nail should turn white and then pink when released.   Resume home medications    DONT:   Do not remove your dressing   Do not get dressing wet.   No driving until released by MD   DO NOT TAKE ADDITIONAL TYLENOL/ACETAMINOPHEN WHILE TAKING NARCOTIC PAIN MEDICATION THAT CONTAINS TYLENOL/ACETAMINOPHEN.    CALL PHYSICIAN FOR:   Pain, burning, or numbness of the toes not relieved by elevation of the leg.   Pale or cold toes; bluish nail beds.   Redness, swelling, or bleeding.   Fever> 101   Drainage (pus) from the puncture sites   Pain unrelieved by pain medication    FOR EMERGENCIES CONTACT YOUR PHYSICIAN [phone]      Discharge Instructions: Using Crutches (Non-Weight-Bearing)  Your healthcare provider has prescribed crutches for you. A healthy leg can support your body weight, but when you have an injured leg or foot, you need to keep weight off it. The swing to method of walking, sometimes called gait, is easy to learn and takes less arm strength and balance. The swing through gait takes more practice, but it moves you farther with each step " and is less tiring overall. Start with swing to and progress to swing through when instructed.      Before you use crutches  Be prepared:  · Remove throw rugs, electrical cords, and anything else that may cause you to fall.  · Arrange your household to keep the items you need handy. Keep everything else out of the way.  · Find a backpack, cammy pack, or apron, or use pockets to carry things. This will help you keep your hands free.  Standing with crutches  Use the balanced standing (tripod) position when you start or end a movement. Also use it whenever youre standing for a length of time.  · Move your crutches in front of you about 12 inches.  · Hold the injured (or weaker) foot off the floor.  · Find your balance.  · Be sure not to rest your armpits on the pads of the crutches.  Walking with crutches  Tips include the following:   · Start in a balanced standing (tripod) position.  · Squeeze the crutch pads against the sides of your chest.  · The bottom tips of the crutches should be wide enough apart for you to move easily between them.  · Support your weight on your hands, not on your armpits.  Swing to gait  · With your crutches in front of you, press down on the handgrips.  · Lift your good (stronger) foot and swing your body up to the crutches.  · Land on your good foot, between your crutches.  · Keep the knee of your injured or weaker foot slightly bent.  · Reach forward and out with the crutches to begin the next step.  Swing through gait  · With your crutches in front of you, press down on the handgrips.  · Lift your good (stronger) foot and swing your body through the crutches.  · Land on your good foot, about 12 inches in front of the crutches.  · Keep the knee of your injured leg slightly bent.  · Reach forward and out with the crutches to begin the next step.  Follow-up  Make a follow-up appointment as directed by your healthcare provider.     When to call your healthcare provider  Call  your healthcare provider right away if you have any of the following:  · Sudden or increased shortness of breath  · Sudden chest pain  · Fever above 100.4°F (38.0°C)  · Increasing redness, tenderness, or swelling at the incision site or in the injured limb  · Drainage from the incision or injured limb  · Opening of the incision or injury  · Localized chest pain with coughing   Date Last Reviewed: 8/1/2016 © 2000-2016 [employer]. [address] [place] [address], [place] [address]. All rights reserved. This information is not intended as a substitute for professional medical care. Always follow your healthcare professional's instructions.      Discharge Instructions: After Your Surgery  Youve just had surgery. During surgery you were given medicine called anesthesia to keep you relaxed and free of pain. After surgery you may have some pain or nausea. This is common. Here are some tips for feeling better and getting well after surgery.     Stay on schedule with your medication.   Going home  Your doctor or nurse will show you how to take care of yourself when you go home. He or she will also answer your questions. Have an adult family member or friend drive you home. For the first 24 hours after your surgery:  · Do not drive or use heavy equipment.  · Do not make important decisions or sign legal papers.  · Do not drink alcohol.  · Have someone stay with you, if needed. He or she can watch for problems and help keep you safe.  Be sure to go to all follow-up visits with your doctor. And rest after your surgery for as long as your doctor tells you to.  Coping with pain  If you have pain after surgery, pain medicine will help you feel better. Take it as told, before pain becomes severe. Also, ask your doctor or pharmacist about other ways to control pain. This might be with heat, ice, or relaxation. And follow any other instructions your surgeon or nurse gives you.  Tips for taking pain medicine  To get the best  relief possible, remember these points:  · Pain medicines can upset your stomach. Taking them with a little food may help.  · Most pain relievers taken by mouth need at least 20 to 30 minutes to start to work.  · Taking medicine on a schedule can help you remember to take it. Try to time your medicine so that you can take it before starting an activity. This might be before you get dressed, go for a walk, or sit down for dinner.  · Constipation is a common side effect of pain medicines. Call your doctor before taking any medicines such as laxatives or stool softeners to help ease constipation. Also ask if you should skip any foods. Drinking lots of fluids and eating foods such as fruits and vegetables that are high in fiber can also help. Remember, do not take laxatives unless your surgeon has prescribed them.  · Drinking alcohol and taking pain medicine can cause dizziness and slow your breathing. It can even be deadly. Do not drink alcohol while taking pain medicine.  · Pain medicine can make you react more slowly to things. Do not drive or run machinery while taking pain medicine.  Your health care provider may tell you to take acetaminophen to help ease your pain. Ask him or her how much you are supposed to take each day. Acetaminophen or other pain relievers may interact with your prescription medicines or other over-the-counter (OTC) drugs. Some prescription medicines have acetaminophen and other ingredients. Using both prescription and OTC acetaminophen for pain can cause you to overdose. Read the labels on your OTC medicines with care. This will help you to clearly know the list of ingredients, how much to take, and any warnings. It may also help you not take too much acetaminophen. If you have questions or do not understand the information, ask your pharmacist or health care provider to explain it to you before you take the OTC medicine.  Managing nausea  Some people have an upset stomach after surgery.  This is often because of anesthesia, pain, or pain medicine, or the stress of surgery. These tips will help you handle nausea and eat healthy foods as you get better. If you were on a special food plan before surgery, ask your doctor if you should follow it while you get better. These tips may help:  · Do not push yourself to eat. Your body will tell you when to eat and how much.  · Start off with clear liquids and soup. They are easier to digest.  · Next try semi-solid foods, such as mashed potatoes, applesauce, and gelatin, as you feel ready.  · Slowly move to solid foods. Dont eat fatty, rich, or spicy foods at first.  · Do not force yourself to have 3 large meals a day. Instead eat smaller amounts more often.  · Take pain medicines with a small amount of solid food, such as crackers or toast, to avoid nausea.     Call your surgeon if  · You still have pain an hour after taking medicine. The medicine may not be strong enough.  · You feel too sleepy, dizzy, or groggy. The medicine may be too strong.  · You have side effects like nausea, vomiting, or skin changes, such as rash, itching, or hives.       If you have obstructive sleep apnea  You were given anesthesia medicine during surgery to keep you comfortable and free of pain. After surgery, you may have more apnea spells because of this medicine and other medicines you were given. The spells may last longer than usual.   At home:  · Keep using the continuous positive airway pressure (CPAP) device when you sleep. Unless your health care provider tells you not to, use it when you sleep, day or night. CPAP is a common device used to treat obstructive sleep apnea.  · Talk with your provider before taking any pain medicine, muscle relaxants, or sedatives. Your provider will tell you about the possible dangers of taking these medicines.  Date Last Reviewed: 10/16/2014  © 7996-8544 [employer]. [address] [place] [address], [place] [address]. All rights  "reserved. This information is not intended as a substitute for professional medical care. Always follow your healthcare professional's instructions.                Primary Diagnosis     Your primary diagnosis was:  Broken Foot      Admission Information     Date & Time Provider Department CSN    4/28/2017  8:29 AM Richard Osborn MD [facility] [id number]      Care Providers     Provider Role Specialty Primary office phone    Richard Osborn MD Attending Provider Orthopedic Surgery [phone]    Richard Osborn MD Surgeon  Orthopedic Surgery [phone]      Your Vitals Were     BP Pulse Temp Resp Height Weight    117/78 (BP Location: Left arm, Patient Position: Lying, BP Method: Automatic) 80 98.1 °F (36.7 °C) (Skin) 14 5' 6" (1.676 m) 81.6 kg (180 lb)    Last Period SpO2 BMI          04/14/2017 100% 29.05 kg/m2        Recent Lab Values     No lab values to display.      Allergies as of 4/28/2017        Reactions    Sulfa (Sulfonamide Antibiotics) Anaphylaxis      Advance Directives     An advance directive is a document which, in the event you are no longer able to make decisions for yourself, tells your healthcare team what kind of treatment you do or do not want to receive, or who you would like to make those decisions for you.  If you do not currently have an advance directive, [facility] encourages you to create one.  For more information call:  [phone]), [phone] ([phone]),  or log on to [email or web address].        Language Assistance Services     ATTENTION: Language assistance services are available, free of charge. Please call [phone].      ATENCIÓN: Si habla español, tiene a pizano disposición servicios gratuitos de asistencia lingüística. Llame al [phone].     CHÚ Ý: N?u b?n nói Ti?ng Vi?t, có các d?ch v? h? tr? ngôn ng? mi?n phí dành cho b?n. G?i s? [phone].        [facility] Sign-Up     Activating your [facility] account is as " easy as 1-2-3!     1) Visit [email or web address], select Sign Up Now, enter this activation code and your date of birth, then select Next.  [email or web address]  Expires: 5/29/2017 12:41 PM      2) Create a username and password to use when you visit [facility] in the future and select a security question in case you lose your password and select Next.    3) Enter your e-mail address and click Sign Up!    Additional Information  If you have questions, please e-mail [email or web address] or call [phone] to talk to our [facility] staff. Remember, [facility] is NOT to be used for urgent needs. For medical emergencies, dial 911.          [facility] complies with applicable Federal civil rights laws and does not discriminate on the basis of race, color, national origin, age, disability, or sex.

## 2017-04-28 NOTE — H&P
"HPI: Priscilla Wall is a 36 y.o. female who complains of right foot pain. She was referred to me by Dr. Vincent Sanchez and was seen in consultation today. The pain began acutely on 4/5/17 when she stepped on a lawn decoration and twisted her foot. She felt and heard a pop. She was seen at urgent care and has been walking in a boot with crutches. She rate her pain as 2/10 today and worse with Weight bearing. She also has h/o previous stress frx on the left foot.      PAST MEDICAL/SURGICAL/FAMILY/SOCIAL/ HISTORY: REVIEWED    ALLERGIES/MEDICATIONS: REVIEWED         Review of Systems:      Constitution: Negative.   HEENT: Negative.   Eyes: Negative.   Cardiovascular: Negative.   Respiratory: Negative.   Endocrine: Negative.   Hematologic/Lymphatic: Negative.   Skin: Negative.   Musculoskeletal: Positive for right foot pain   Gastrointestinal: Negative.   Genitourinary: Negative.   Neurological: Negative.   Psychiatric/Behavioral: Negative.   Allergic/Immunologic: Negative.         PHYSICAL EXAM:      Vitals:     04/25/17 1503   BP: 128/85   Pulse: 101          Ht Readings from Last 1 Encounters:   04/25/17 5' 6" (1.676 m)          Wt Readings from Last 1 Encounters:   04/25/17 77.1 kg (170 lb)            GENERAL: Well developed, well nourished, no acute distress.  SKIN: Skin is intact. No atrophy, abrasions or lesions are noted.   Neurological: Normal mental status. Appropriate and conversant. Alert and oriented x 3.  GAIT: Walks with crutches.     Right lower extremity compared with LLE: 2+ dorsalis pedis pulse. Capillary refill < 3 seconds. Normal range of motion tibiotalar and subtalar joints. Pes cavovarus.  5/5 strength EHL, FHL, tibialis anterior, gastrocsoleus, tibialis posterior and peroneals. Sensation to light touch intact sural, saphenous, superficial peroneal and deep peroneal nerves. Mild swelling of the lateral foot, no ecchymosis or deformity. No lymphadenopathy, no masses or tumors palpated.  tenderness to " palpation lateral 5th metatarsal diaphysis.         XRAYS:  3 views of right foot obtained and reviewed today reveal martinez fracture of the right foot with minimal evidence of healing.         ASSESSMENT:         Right 5th metatarsal martinez fracture    PLAN:  I spent 20 minutes in consulation with the patient today. More than half the time was spent counseling the patient on her condition and the options for operative versus non-operative care.  I agree with Dr. Sanchez and I recommend open reduction internal fixation right 5th metatarsal frx with intra-medullary screw fixation and possible bone grafting. I have explained the procedure, including indications, risks, and alternatives. Priscilla Wall gave informed consent and is medically ready for surgery. To OR 4/28/17 [facility].

## 2017-05-08 ENCOUNTER — TELEPHONE (OUTPATIENT)
Dept: ORTHOPEDICS | Facility: CLINIC | Age: 37
End: 2017-05-08

## 2017-05-08 NOTE — TELEPHONE ENCOUNTER
----- Message from Margarita Montoya sent at 5/8/2017 12:35 PM CDT -----  Contact: self  Needs to discuss splint, states it's loose.  Please call back at [phone] (home)

## 2017-05-09 ENCOUNTER — TELEPHONE (OUTPATIENT)
Dept: ORTHOPEDICS | Facility: CLINIC | Age: 37
End: 2017-05-09

## 2017-05-09 NOTE — TELEPHONE ENCOUNTER
Patient states that her splint feels loose. Advised patient that is normal as the swelling begins to recede. Advised patient that she can wrap another ace bandage over the current one if she would like. Patient states understanding.

## 2017-05-09 NOTE — TELEPHONE ENCOUNTER
----- Message from Stacey M Lefort sent at 5/8/2017  4:12 PM CDT -----  Contact: Patient   Patient is returning your call.

## 2017-05-15 DIAGNOSIS — M84.375D STRESS FRACTURE OF LEFT FOOT WITH ROUTINE HEALING, SUBSEQUENT ENCOUNTER: ICD-10-CM

## 2017-05-15 DIAGNOSIS — S92.351A CLOSED DISPLACED FRACTURE OF FIFTH METATARSAL BONE OF RIGHT FOOT, INITIAL ENCOUNTER: Primary | ICD-10-CM

## 2017-05-16 ENCOUNTER — OFFICE VISIT (OUTPATIENT)
Dept: ORTHOPEDICS | Facility: CLINIC | Age: 37
End: 2017-05-16
Payer: MEDICAID

## 2017-05-16 ENCOUNTER — HOSPITAL ENCOUNTER (OUTPATIENT)
Dept: RADIOLOGY | Facility: HOSPITAL | Age: 37
Discharge: HOME | End: 2017-05-16
Attending: ORTHOPAEDIC SURGERY
Payer: MEDICAID

## 2017-05-16 VITALS
HEIGHT: 66 IN | HEART RATE: 103 BPM | SYSTOLIC BLOOD PRESSURE: 128 MMHG | DIASTOLIC BLOOD PRESSURE: 80 MMHG | BODY MASS INDEX: 28.93 KG/M2 | WEIGHT: 180 LBS

## 2017-05-16 DIAGNOSIS — S92.351A CLOSED DISPLACED FRACTURE OF FIFTH METATARSAL BONE OF RIGHT FOOT, INITIAL ENCOUNTER: ICD-10-CM

## 2017-05-16 DIAGNOSIS — M84.375D STRESS FRACTURE OF LEFT FOOT WITH ROUTINE HEALING, SUBSEQUENT ENCOUNTER: ICD-10-CM

## 2017-05-16 DIAGNOSIS — S92.351D CLOSED DISPLACED FRACTURE OF FIFTH METATARSAL BONE OF RIGHT FOOT WITH ROUTINE HEALING, SUBSEQUENT ENCOUNTER: Primary | ICD-10-CM

## 2017-05-16 PROCEDURE — 99024 POSTOP FOLLOW-UP VISIT: CPT | Mod: ,,, | Performed by: ORTHOPAEDIC SURGERY

## 2017-05-16 PROCEDURE — 29405 APPL SHORT LEG CAST: CPT | Mod: PBBFAC,PO | Performed by: ORTHOPAEDIC SURGERY

## 2017-05-16 PROCEDURE — 73630 X-RAY EXAM OF FOOT: CPT | Mod: 26,RT,, | Performed by: RADIOLOGY

## 2017-05-16 PROCEDURE — 29405 APPL SHORT LEG CAST: CPT | Mod: S$PBB,,, | Performed by: ORTHOPAEDIC SURGERY

## 2017-05-16 PROCEDURE — 99213 OFFICE O/P EST LOW 20 MIN: CPT | Mod: PBBFAC,25,PO | Performed by: ORTHOPAEDIC SURGERY

## 2017-05-16 PROCEDURE — 99999 PR PBB SHADOW E&M-EST. PATIENT-LVL III: CPT | Mod: PBBFAC,,, | Performed by: ORTHOPAEDIC SURGERY

## 2017-05-16 NOTE — PROGRESS NOTES
Subjective:      Patient ID: Priscilla Wall is a 36 y.o. female.    Chief Complaint: Post-op Evaluation of the Right Foot (DOS: 4-28-17/ORIF metatarsals )    Doing well today s/p orif 5th metatarsal martinez fracture. She rates her pain as 0/10 today. She removed her own splint on Saturday and she has been putting some weight on it.     Social History     Occupational History    Not on file.     Social History Main Topics    Smoking status: Never Smoker    Smokeless tobacco: Never Used    Alcohol use No    Drug use: No    Sexual activity: Not on file            Objective:    Ortho Exam     RLE: <POSTOP> neurovascularly intact, incision is well healed, No signs of infection. + ttp at the fracture site.     XRAYS: 3 views of right foot obtained and reviewed today reveal minimal interval progression of healing of the martinez fracture. Hardware is intact.   Assessment:       1. Closed displaced fracture of fifth metatarsal bone of right foot with routine healing, subsequent encounter          Plan:       Strict Non-weightbearing. Short leg cast applied. F/u 2 weeks with xray out of plaster right foot.

## 2017-05-24 ENCOUNTER — TELEPHONE (OUTPATIENT)
Dept: ORTHOPEDICS | Facility: CLINIC | Age: 37
End: 2017-05-24

## 2017-05-24 NOTE — TELEPHONE ENCOUNTER
----- Message from Katerina East sent at 5/24/2017 10:10 AM CDT -----  Contact: self  Patient [phone] is calling to say she has a cast on her right foot and the bottom of the cast is bothering her/she is asking what she needs to do/please advise

## 2017-05-24 NOTE — TELEPHONE ENCOUNTER
Patient states that her cast feels wet inside and the heel is missing. Patient to come in to the [facility] to have cast changed. Patient stated understanding.

## 2017-05-26 DIAGNOSIS — M79.671 RIGHT FOOT PAIN: Primary | ICD-10-CM

## 2017-05-30 ENCOUNTER — OFFICE VISIT (OUTPATIENT)
Dept: ORTHOPEDICS | Facility: CLINIC | Age: 37
End: 2017-05-30
Payer: MEDICAID

## 2017-05-30 ENCOUNTER — HOSPITAL ENCOUNTER (OUTPATIENT)
Dept: RADIOLOGY | Facility: HOSPITAL | Age: 37
Discharge: HOME | End: 2017-05-30
Attending: ORTHOPAEDIC SURGERY
Payer: MEDICAID

## 2017-05-30 ENCOUNTER — TELEPHONE (OUTPATIENT)
Dept: ORTHOPEDICS | Facility: CLINIC | Age: 37
End: 2017-05-30

## 2017-05-30 VITALS
BODY MASS INDEX: 28.93 KG/M2 | WEIGHT: 180 LBS | HEIGHT: 66 IN | DIASTOLIC BLOOD PRESSURE: 80 MMHG | SYSTOLIC BLOOD PRESSURE: 139 MMHG | HEART RATE: 124 BPM

## 2017-05-30 DIAGNOSIS — M79.671 RIGHT FOOT PAIN: ICD-10-CM

## 2017-05-30 DIAGNOSIS — S92.351D CLOSED DISPLACED FRACTURE OF FIFTH METATARSAL BONE OF RIGHT FOOT WITH ROUTINE HEALING, SUBSEQUENT ENCOUNTER: Primary | ICD-10-CM

## 2017-05-30 PROBLEM — T81.49XA POSTOPERATIVE WOUND INFECTION: Status: ACTIVE | Noted: 2017-05-30

## 2017-05-30 PROCEDURE — 99213 OFFICE O/P EST LOW 20 MIN: CPT | Mod: PBBFAC,25,PO | Performed by: ORTHOPAEDIC SURGERY

## 2017-05-30 PROCEDURE — 99999 PR PBB SHADOW E&M-EST. PATIENT-LVL III: CPT | Mod: PBBFAC,,, | Performed by: ORTHOPAEDIC SURGERY

## 2017-05-30 PROCEDURE — 99024 POSTOP FOLLOW-UP VISIT: CPT | Mod: ,,, | Performed by: ORTHOPAEDIC SURGERY

## 2017-05-30 PROCEDURE — 73630 X-RAY EXAM OF FOOT: CPT | Mod: 26,RT,, | Performed by: RADIOLOGY

## 2017-05-30 RX ORDER — MUPIROCIN 20 MG/G
1 OINTMENT TOPICAL
Status: CANCELLED | OUTPATIENT
Start: 2017-05-30

## 2017-05-30 RX ORDER — CLINDAMYCIN HYDROCHLORIDE 300 MG/1
300 CAPSULE ORAL EVERY 6 HOURS
Qty: 8 CAPSULE | Refills: 0 | Status: ON HOLD | OUTPATIENT
Start: 2017-05-30 | End: 2017-06-03 | Stop reason: HOSPADM

## 2017-05-30 NOTE — TELEPHONE ENCOUNTER
Scheduled patient for 115pm today in [facility]. Patient stated understanding.

## 2017-05-30 NOTE — TELEPHONE ENCOUNTER
----- Message from Thelam Mckeon sent at 5/30/2017  9:27 AM CDT -----  Call pt at [phone] / pt was not able to come in for this morning's appt .. Asking to be seen today at a later time

## 2017-05-30 NOTE — TELEPHONE ENCOUNTER
----- Message from Brittnee Penny sent at 5/30/2017  3:47 PM CDT -----  This IP admission that was scheduled today is not approved - it may take up to 48hrs to obtain an IP authorization.    [facility] is calling for the authorization    Please advise    Brittnee  [id number]

## 2017-05-30 NOTE — PROGRESS NOTES
Subjective:      Patient ID: Priscilla Wall is a 36 y.o. female.    Chief Complaint: Post-op Evaluation of the Right Foot and Post-op Evaluation (s/p ORIF 5TH metatarsal 4/28/17)    Having increased pain today s/p orif 5th metatarsal martinez fracture.  She rates her pain as 4/10 today. She removed her own cast and she has been walking on the foot despite instructions on strict Non-weightbearing.     Social History     Occupational History    Not on file.     Social History Main Topics    Smoking status: Never Smoker    Smokeless tobacco: Never Used    Alcohol use No    Drug use: No    Sexual activity: Not on file            Objective:    Ortho Exam     RLE: <POSTOP> neurovascularly intact, serous drainage from incision, + swelling, cellulitis and tenderness to palpation at the lateral foot in the area of the incision and the fracture. + ttp at the fracture site.     XRAYS: 3 views of right foot obtained and reviewed today reveal no interval progression of healing of the martinez fracture. Hardware is intact.       Assessment:       1. Closed displaced fracture of fifth metatarsal bone of right foot with routine healing, subsequent encounter    2. Postoperative wound infection, initial encounter        Non-compliance with post op instructions.   Plan:       Strict Non-weightbearing.      I spent 5 minutes in consulation with the patient today. More than half the time was spent counseling the patient on her condition. I discussed with her that she non has a non-union and it is infected. I started her on Clindamycin today. I told her that if she continues to walk on it she can break the screw.  I also told her that is she has infection in the bone and she continue with non-compliance she could lose part of the foot.   I spoke with hospital medicine who will admit her post-op tomorrow for IV antibiotics. I told her I will have to remove her hardware and she may require IV antibiotics for 6 weeks. I will consult ID on  admission.   If she does no have osteomyelitis she may be able to undergo revision ORIF with bone grafting in future.

## 2017-05-31 ENCOUNTER — TELEPHONE (OUTPATIENT)
Dept: ORTHOPEDICS | Facility: CLINIC | Age: 37
End: 2017-05-31

## 2017-05-31 PROBLEM — D50.9 IRON DEFICIENCY ANEMIA: Status: ACTIVE | Noted: 2017-05-31

## 2017-05-31 PROBLEM — D64.9 SEVERE ANEMIA: Status: ACTIVE | Noted: 2017-05-31

## 2017-05-31 NOTE — TELEPHONE ENCOUNTER
Patient called the office about her surgery that was scheduled for today but was canceled due to the patient not showing up. Patient states that she has been at [facility] since a little bit after 8. Advised patient that the OR called at 9am due to the patient not being there. Dr. Osborn canceled the case as patient was not there. Patient proceeded to say that she was told not to be there till 9am. Advised patient that she was told to be there for 8am for her case that was to start at 10am. Patient was advised that Dr. Osborn canceled the case due to not being compliant and not showing up for her surgery. Patient was advised to go to [facility] ER for evaluation. Patient was also told that she will receive a discharge letter in the mail. Patient asked does this mean she will not see me any longer. Advised patient that she will not be seen any longer by Dr. Osborn as she does not follow the instructions given to her. Patient then hung up the phone.

## 2017-05-31 NOTE — TELEPHONE ENCOUNTER
Received message to call Dr. Lemon at [facility] in regards to patient. Notified Dr. Osborn. Dr. Osborn gave orders to call physician back and advise that the patient was instructed to go to [facility] for care after not showing up for her surgery as instructed. Call placed to Dr. Lemon. Advised her that Dr. Osborn has instructed the patient to seek care at [facility] due to non compliance. Patient was instructed to this on 2 occassions this am. Dr. Lemon stated that she needed to be speak with Dr. Osborn and not her staff. Will notify Dr. Ascencio.

## 2017-05-31 NOTE — TELEPHONE ENCOUNTER
----- Message from Stacey M Lefort sent at 5/31/2017  9:34 AM CDT -----  She needs a callback about the surgery that was scheduled for today. Please call Priscilla at [phone] as soon as possible. Thank you.

## 2017-05-31 NOTE — TELEPHONE ENCOUNTER
----- Message from Mary Trinidad sent at 5/31/2017 12:33 PM CDT -----  Contact: Yogesh Nelsno from [facility] [phone]  Call placed to pod Dr. Zuñiga from [facility]  called earlier she needs to speak to you about this patient. Please  call  Back at [phone]

## 2017-05-31 NOTE — TELEPHONE ENCOUNTER
Return call to patient. Patient's mother answered the phone. Advised her that I can not speak to her as this is a HIPPA violation. Patient then gets on phone and asks if her surgery can be rescheduled. Advised patient that she was instructed to go to [place] [facility] and be seen there as she is non compliant on the instructions that Dr. Osborn has given her. Patient hung up on this nurse.

## 2017-06-03 PROBLEM — K27.3 PEPTIC ULCER, ACUTE: Status: ACTIVE | Noted: 2017-06-03

## 2017-06-03 PROBLEM — K27.0: Status: ACTIVE | Noted: 2017-06-03

## 2017-06-06 DIAGNOSIS — Z98.890 S/P HARDWARE REMOVAL: Primary | ICD-10-CM

## 2017-06-08 ENCOUNTER — OFFICE VISIT (OUTPATIENT)
Dept: ORTHOPEDICS | Facility: CLINIC | Age: 37
End: 2017-06-08
Payer: MEDICAID

## 2017-06-08 ENCOUNTER — HOSPITAL ENCOUNTER (OUTPATIENT)
Dept: RADIOLOGY | Facility: HOSPITAL | Age: 37
Discharge: HOME | End: 2017-06-08
Attending: ORTHOPAEDIC SURGERY
Payer: MEDICAID

## 2017-06-08 VITALS — HEIGHT: 66 IN | BODY MASS INDEX: 28.93 KG/M2 | WEIGHT: 180 LBS

## 2017-06-08 DIAGNOSIS — Z98.890 S/P HARDWARE REMOVAL: ICD-10-CM

## 2017-06-08 DIAGNOSIS — Z98.890 S/P HARDWARE REMOVAL: Primary | ICD-10-CM

## 2017-06-08 DIAGNOSIS — S92.352K CLOSED DISPLACED FRACTURE OF FIFTH METATARSAL BONE OF LEFT FOOT WITH NONUNION, SUBSEQUENT ENCOUNTER: ICD-10-CM

## 2017-06-08 DIAGNOSIS — T84.7XXD WOUND INFECTION COMPLICATING HARDWARE, SUBSEQUENT ENCOUNTER: ICD-10-CM

## 2017-06-08 PROCEDURE — 99999 PR PBB SHADOW E&M-EST. PATIENT-LVL II: CPT | Mod: PBBFAC,,, | Performed by: ORTHOPAEDIC SURGERY

## 2017-06-08 PROCEDURE — 99212 OFFICE O/P EST SF 10 MIN: CPT | Mod: PBBFAC,25,PO | Performed by: ORTHOPAEDIC SURGERY

## 2017-06-08 PROCEDURE — 73630 X-RAY EXAM OF FOOT: CPT | Mod: 26,RT,, | Performed by: RADIOLOGY

## 2017-06-08 PROCEDURE — 99024 POSTOP FOLLOW-UP VISIT: CPT | Mod: ,,, | Performed by: ORTHOPAEDIC SURGERY

## 2017-06-08 NOTE — PROGRESS NOTES
"DATE: 6/8/2017  PATIENT: Priscilla Wall    Attending Physician: Hari Perez M.D.    HISTORY:  Priscilla Wall is a 36 y.o. female who returns for follow up evaluation of  her right foot.  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  Cultures came back positive for methicillin resistant staph aureus.  Currently she is on daptomycin and Rocephin.  He states that her pain is 3/10.  She has been nonweightbearing in the Cam Walker.    PMH/PSH/FamHx/SocHx:  Reviewed and unchanged from prior visit    ROS:  Constitution: Negative for chills, fever, and sweats. Negative for unexplained weight loss.  HENT: Negative for headaches and blurry vision.   Cardiovascular: Negative for chest pain, irregular heartbeat, leg swelling and palpitations.   Respiratory: Negative for cough and shortness of breath.   Gastrointestinal: Negative for abdominal pain, heartburn, nausea and vomiting.   Genitourinary: Negative for bladder incontinence and dysuria.   Musculoskeletal: Negative for systemic arthritis, joint swelling, muscle weakness and myalgias.   Neurological: Negative for numbness.   Psychiatric/Behavioral: Negative for depression.   Endocrine: Negative for polyuria.   Hematologic/Lymphatic: Negative for bleeding disorders.  Skin: Negative for poor wound healing.       EXAM:  Ht 5' 6" (1.676 m)   Wt 81.6 kg (180 lb)   LMP 05/30/2017   BMI 29.05 kg/m²   Healthy-appearing female no acute distress.  Incision is clean and dry.  The right foot.  Sensation intact to the toes.  Range of motion within normal limits.    IMAGING:   X-rays of the right foot are performed and reviewed.  Fifth metatarsal nonunion seen.  Interval screw removal noted.  There does appear to be callous forming laterally.    ASSESSMENT:  Status post irrigation debridement and hardware removal with wound closure right fifth metatarsal infected nonunion, " 6/2/17.    PLAN:  The implications of the patient's evolution of symptoms and findings were explained to the patient and her mother in detail.  Sutures are removed today.  I spoke with Dr. Ruth from infectious disease who recommended discontinuing the Rocephin as the methicillin-resistant staph aureus sensitive to daptomycin.  She'll continue daptomycin for a total of 6 weeks.  I recommended that separate, CRP) level checked and monitored by her primary care physician to make sure the infection is improving.  She may weight-bear as tolerated in the boot.  I've also recommended Exogen unit to try and heal the nonunion.  Follow-up in 4 weeks' time for reexam.    This note was dictated using voice recognition software and may contain grammatical errors

## 2017-06-09 ENCOUNTER — TELEPHONE (OUTPATIENT)
Dept: ORTHOPEDICS | Facility: CLINIC | Age: 37
End: 2017-06-09

## 2017-06-09 NOTE — TELEPHONE ENCOUNTER
----- Message from Krys Aiken sent at 6/9/2017 11:38 AM CDT -----  Contact: Patient  Patient states that she was just seen yesterday and the stitches were removed from her foot and not the sterile strip is not sticking and it is now oozing.  Please call [phone].  Thank you

## 2017-06-09 NOTE — TELEPHONE ENCOUNTER
Contacted pt informed her per Dr Perez he would like pt to be seen by him Monday 6/12/17. Made appointment. Pt verbalized understanding.

## 2017-06-12 ENCOUNTER — OFFICE VISIT (OUTPATIENT)
Dept: ORTHOPEDICS | Facility: CLINIC | Age: 37
End: 2017-06-12
Payer: MEDICAID

## 2017-06-12 ENCOUNTER — TELEPHONE (OUTPATIENT)
Dept: ORTHOPEDICS | Facility: CLINIC | Age: 37
End: 2017-06-12

## 2017-06-12 VITALS — BODY MASS INDEX: 28.93 KG/M2 | WEIGHT: 180 LBS | HEIGHT: 66 IN

## 2017-06-12 DIAGNOSIS — Z98.890 S/P HARDWARE REMOVAL: Primary | ICD-10-CM

## 2017-06-12 DIAGNOSIS — T84.7XXD WOUND INFECTION COMPLICATING HARDWARE, SUBSEQUENT ENCOUNTER: ICD-10-CM

## 2017-06-12 DIAGNOSIS — S92.352K CLOSED DISPLACED FRACTURE OF FIFTH METATARSAL BONE OF LEFT FOOT WITH NONUNION, SUBSEQUENT ENCOUNTER: ICD-10-CM

## 2017-06-12 PROCEDURE — 99999 PR PBB SHADOW E&M-EST. PATIENT-LVL II: CPT | Mod: PBBFAC,,, | Performed by: ORTHOPAEDIC SURGERY

## 2017-06-12 PROCEDURE — 99212 OFFICE O/P EST SF 10 MIN: CPT | Mod: PBBFAC,PO | Performed by: ORTHOPAEDIC SURGERY

## 2017-06-12 PROCEDURE — 99024 POSTOP FOLLOW-UP VISIT: CPT | Mod: ,,, | Performed by: ORTHOPAEDIC SURGERY

## 2017-06-12 RX ORDER — OXYCODONE AND ACETAMINOPHEN 10; 325 MG/1; MG/1
1 TABLET ORAL EVERY 4 HOURS PRN
Qty: 60 TABLET | Refills: 0 | Status: SHIPPED | OUTPATIENT
Start: 2017-06-12 | End: 2021-02-24 | Stop reason: CLARIF

## 2017-06-12 NOTE — TELEPHONE ENCOUNTER
----- Message from Margarita Montoya sent at 6/9/2017  3:20 PM CDT -----  Contact: self  Needs refill on percocet.  Please call back at [phone] (home)     [facility] [id number] [place] [address] & [address] [address]  Phone: [phone] Fax: [phone]

## 2017-06-12 NOTE — PROGRESS NOTES
"DATE: 6/12/2017  PATIENT: Priscilla Wall    Attending Physician: Hari Perez M.D.    HISTORY:  Priscilla Wall is a 36 y.o. female who returns for follow up evaluation of  her right fifth metatarsal infected nonunion.  She underwent hardware removal and irrigation and debridement with wound closure, 6/2/17.  She notes a small amount of drainage like to have this reexamined.  She is currently on the daptomycin daily.  Denies any fevers or chills.    PMH/PSH/FamHx/SocHx:  Reviewed and unchanged from prior visit    ROS:  Constitution: Negative for chills, fever, and sweats. Negative for unexplained weight loss.  HENT: Negative for headaches and blurry vision.   Cardiovascular: Negative for chest pain, irregular heartbeat, leg swelling and palpitations.   Respiratory: Negative for cough and shortness of breath.   Gastrointestinal: Negative for abdominal pain, heartburn, nausea and vomiting.   Genitourinary: Negative for bladder incontinence and dysuria.   Musculoskeletal: Negative for systemic arthritis, joint swelling, muscle weakness and myalgias.   Neurological: Negative for numbness.   Psychiatric/Behavioral: Negative for depression.   Endocrine: Negative for polyuria.   Hematologic/Lymphatic: Negative for bleeding disorders.  Skin: Negative for poor wound healing.       EXAM:  Ht 5' 6" (1.676 m)   Wt 81.6 kg (180 lb)   LMP 05/30/2017   BMI 29.05 kg/m²   Healthy appearing female no acute distress.  Examination right foot reveals the incision has Slightly.  There is early eschar formation.  No drainage noted.  No surrounding erythema.  Sensation intact to the toes.    IMAGING:   No x-rays are performed today.    ASSESSMENT:  Status post irrigation debridement and hardware removal with wound closure right fifth metatarsal infected nonunion, 6/2/17.    PLAN:  The implications of the patient's evolution of symptoms and findings were explained to the patient in detail.  I have explained to Adiel that the incision " and wound look satisfactory.  I've counseled her on local wound care.  She'll continue the IV antibiotics.  She will obtain CBC with differential sedimentation rate CRP and cholesterol levels prior to her next visit.  Follow-up in 2 weeks' time for reexam.      This note was dictated using voice recognition software and may contain grammatical errors

## 2017-06-12 NOTE — TELEPHONE ENCOUNTER
Pt has appointment this morning 6/12/17 with Dr Perez. Pain medication refill will be addressed at that time.

## 2017-06-13 ENCOUNTER — TELEPHONE (OUTPATIENT)
Dept: ORTHOPEDICS | Facility: CLINIC | Age: 37
End: 2017-06-13

## 2017-06-13 NOTE — TELEPHONE ENCOUNTER
----- Message from Krys Aiken sent at 6/13/2017  4:16 PM CDT -----  Contact: [facility]    States that the patient's levels are very low and to please call them back at [phone].  They do close at 5pm but they do have an On Call Service also.   Thank you

## 2017-06-20 DIAGNOSIS — Z98.890 S/P HARDWARE REMOVAL: Primary | ICD-10-CM

## 2017-06-26 ENCOUNTER — OFFICE VISIT (OUTPATIENT)
Dept: ORTHOPEDICS | Facility: CLINIC | Age: 37
End: 2017-06-26
Payer: MEDICAID

## 2017-06-26 ENCOUNTER — HOSPITAL ENCOUNTER (OUTPATIENT)
Dept: RADIOLOGY | Facility: HOSPITAL | Age: 37
Discharge: HOME | End: 2017-06-26
Attending: ORTHOPAEDIC SURGERY
Payer: MEDICAID

## 2017-06-26 VITALS — BODY MASS INDEX: 28.93 KG/M2 | HEIGHT: 66 IN | WEIGHT: 180 LBS

## 2017-06-26 DIAGNOSIS — Z98.890 S/P HARDWARE REMOVAL: Primary | ICD-10-CM

## 2017-06-26 DIAGNOSIS — Z98.890 S/P HARDWARE REMOVAL: ICD-10-CM

## 2017-06-26 DIAGNOSIS — T84.7XXD WOUND INFECTION COMPLICATING HARDWARE, SUBSEQUENT ENCOUNTER: ICD-10-CM

## 2017-06-26 DIAGNOSIS — S92.352K CLOSED DISPLACED FRACTURE OF FIFTH METATARSAL BONE OF LEFT FOOT WITH NONUNION, SUBSEQUENT ENCOUNTER: ICD-10-CM

## 2017-06-26 PROCEDURE — 99999 PR PBB SHADOW E&M-EST. PATIENT-LVL II: CPT | Mod: PBBFAC,,, | Performed by: ORTHOPAEDIC SURGERY

## 2017-06-26 PROCEDURE — 99024 POSTOP FOLLOW-UP VISIT: CPT | Mod: ,,, | Performed by: ORTHOPAEDIC SURGERY

## 2017-06-26 PROCEDURE — 99212 OFFICE O/P EST SF 10 MIN: CPT | Mod: PBBFAC,25,PO | Performed by: ORTHOPAEDIC SURGERY

## 2017-06-26 PROCEDURE — 73630 X-RAY EXAM OF FOOT: CPT | Mod: 26,RT,, | Performed by: RADIOLOGY

## 2017-06-27 NOTE — PROGRESS NOTES
"DATE: 6/26/2017  PATIENT: Priscilla Wall    Attending Physician: Hari Perez M.D.    HISTORY:  Priscilla Wall is a 36 y.o. female who returns for follow up evaluation of  her right fifth metatarsal infected nonunion.  She underwent irrigation debridement with hardware removal on 6/2/17.  She is currently on daptomycin and is on day 24.  She denies any fevers or chills.  She does note some dehiscence and opening of the wound but it appears to be healing and filling in.  Pain is reported at 3/10 today.    PMH/PSH/FamHx/SocHx:  Reviewed and unchanged from prior visit    ROS:  Constitution: Negative for chills, fever, and sweats. Negative for unexplained weight loss.  HENT: Negative for headaches and blurry vision.   Cardiovascular: Negative for chest pain, irregular heartbeat, leg swelling and palpitations.   Respiratory: Negative for cough and shortness of breath.   Gastrointestinal: Negative for abdominal pain, heartburn, nausea and vomiting.   Genitourinary: Negative for bladder incontinence and dysuria.   Musculoskeletal: Negative for systemic arthritis, joint swelling, muscle weakness and myalgias.   Neurological: Negative for numbness.   Psychiatric/Behavioral: Negative for depression.   Endocrine: Negative for polyuria.   Hematologic/Lymphatic: Negative for bleeding disorders.  Skin: Negative for poor wound healing.       EXAM:  Ht 5' 6" (1.676 m)   Wt 81.6 kg (180 lb)   LMP 05/30/2017   BMI 29.05 kg/m²   Healthy appearing female no acute distress.  Examination the right foot reveals the incision has dehisced but there does appear to be granulation tissue filling in.  No drainage noted.  Early scab formation present.  Mild tenderness palpation about the lateral foot.  Sensation intact to the toes.    IMAGING:   X-rays of the right foot are performed and reviewed.  Nonunion is remain in acceptable position.  There does appear to be early healing medially but the fracture line clearly visible " laterally.    ASSESSMENT:  Status post irrigation debridement and hardware removal with wound closure right fifth metatarsal infected nonunion, 6/2/17.    PLAN:  The implications of the patient's evolution of symptoms and findings were explained to the patient in detail.  I have explained to Adiel that her sedimentation rate and CRP are coming down.  Priscilla will continue on the daptomycin for a 6 week course.  I have instructed her on local wound care and to monitor the wound to make sure that is healing.  She'll use her CAM walker when weightbearing but remove it to take pressure off the wound.  We've also just arranged for her to have a bone stimulator unit assist with healing of the nonunion.  Follow-up in 2 weeks' time for reexam and x-rays of the right foot..    This note was dictated using voice recognition software and may contain grammatical errors

## 2017-07-06 DIAGNOSIS — Z98.890 S/P HARDWARE REMOVAL: Primary | ICD-10-CM

## 2017-07-10 ENCOUNTER — HOSPITAL ENCOUNTER (OUTPATIENT)
Dept: RADIOLOGY | Facility: HOSPITAL | Age: 37
Discharge: HOME | End: 2017-07-10
Attending: ORTHOPAEDIC SURGERY
Payer: MEDICAID

## 2017-07-10 ENCOUNTER — OFFICE VISIT (OUTPATIENT)
Dept: ORTHOPEDICS | Facility: CLINIC | Age: 37
End: 2017-07-10
Payer: MEDICAID

## 2017-07-10 VITALS — HEIGHT: 66 IN | WEIGHT: 180 LBS | BODY MASS INDEX: 28.93 KG/M2

## 2017-07-10 DIAGNOSIS — Z87.81 S/P ORIF (OPEN REDUCTION INTERNAL FIXATION) FRACTURE: Primary | ICD-10-CM

## 2017-07-10 DIAGNOSIS — Z98.890 S/P HARDWARE REMOVAL: ICD-10-CM

## 2017-07-10 DIAGNOSIS — Z98.890 S/P ORIF (OPEN REDUCTION INTERNAL FIXATION) FRACTURE: Primary | ICD-10-CM

## 2017-07-10 DIAGNOSIS — T84.7XXD WOUND INFECTION COMPLICATING HARDWARE, SUBSEQUENT ENCOUNTER: ICD-10-CM

## 2017-07-10 PROCEDURE — 73630 X-RAY EXAM OF FOOT: CPT | Mod: 26,RT,, | Performed by: RADIOLOGY

## 2017-07-10 PROCEDURE — 99024 POSTOP FOLLOW-UP VISIT: CPT | Mod: ,,, | Performed by: ORTHOPAEDIC SURGERY

## 2017-07-10 PROCEDURE — 99999 PR PBB SHADOW E&M-EST. PATIENT-LVL III: CPT | Mod: PBBFAC,,, | Performed by: ORTHOPAEDIC SURGERY

## 2017-07-10 PROCEDURE — 99213 OFFICE O/P EST LOW 20 MIN: CPT | Mod: PBBFAC,25,PO | Performed by: ORTHOPAEDIC SURGERY

## 2017-07-10 NOTE — PROGRESS NOTES
"DATE: 7/10/2017  PATIENT: Priscilla Wall    Attending Physician: Hari Perez M.D.    HISTORY:  Priscilla Wall is a 36 y.o. female who returns for follow up evaluation of  her right fifth metatarsal infected nonunion.  She underwent irrigation debridement and hardware removal and 6/2/17.  She has been on daptomycin for MRSA infection.  She states that she still has an open wound.  She denies any fevers or chills.  She has received the bone stimulator has started to use it.  She is weightbearing as tolerated in the Cam Walker.    PMH/PSH/FamHx/SocHx:  Reviewed and unchanged from prior visit    ROS:  Constitution: Negative for chills, fever, and sweats. Negative for unexplained weight loss.  HENT: Negative for headaches and blurry vision.   Cardiovascular: Negative for chest pain, irregular heartbeat, leg swelling and palpitations.   Respiratory: Negative for cough and shortness of breath.   Gastrointestinal: Negative for abdominal pain, heartburn, nausea and vomiting.   Genitourinary: Negative for bladder incontinence and dysuria.   Musculoskeletal: Negative for systemic arthritis, joint swelling, muscle weakness and myalgias.   Neurological: Negative for numbness.   Psychiatric/Behavioral: Negative for depression.   Endocrine: Negative for polyuria.   Hematologic/Lymphatic: Negative for bleeding disorders.  Skin: Negative for poor wound healing.       EXAM:  Ht 5' 6" (1.676 m)   Wt 81.6 kg (180 lb)   BMI 29.05 kg/m²   Healthy-appearing female no acute distress.  Examination right foot reveals a 2 x 1 cm wound over the previous incision.  It does appear to show granulation tissue.  No exposed bone identified.  Sensation intact to the toes.    IMAGING:   X-rays of the right foot are performed and reviewed.  Fifth metatarsal nonunion still identified.    ASSESSMENT:  Status post irrigation debridement and hardware removal with wound closure right fifth metatarsal infected nonunion, 6/2/17.    PLAN:  The " implications of the patient's evolution of symptoms and findings were explained to the patient in detail.. I have explained to Adiel that the wound is taking longer to heal than I would like, leaving given the infection.  He is on appropriate antibiotics.  I recommended consultation with the [facility] wound care clinic to see if they have any further recommendations.  I've asked her to broach the subject: Re: A wound VAC to try and get this closed.  She'll continue with the IV antibiotics.  I like to see her back in one week's time for reexam to discuss further treatment recommendations and the recommendations of the wound care clinic.      This note was dictated using voice recognition software and may contain grammatical errors

## 2017-07-11 PROBLEM — T81.89XA DELAYED SURGICAL WOUND HEALING: Status: ACTIVE | Noted: 2017-07-11

## 2017-07-11 PROBLEM — L97.912 NON-PRESSURE CHRONIC ULCER OF RIGHT LOWER LEG WITH FAT LAYER EXPOSED: Status: ACTIVE | Noted: 2017-07-11

## 2017-07-11 PROBLEM — Z98.890 S/P HARDWARE REMOVAL: Status: ACTIVE | Noted: 2017-07-11

## 2017-07-13 ENCOUNTER — TELEPHONE (OUTPATIENT)
Dept: ORTHOPEDICS | Facility: CLINIC | Age: 37
End: 2017-07-13

## 2017-07-13 NOTE — TELEPHONE ENCOUNTER
----- Message from Angi Azevedo sent at 7/13/2017  8:54 AM CDT -----  Contact:  call //[phone]  [facility]    Calling  About    IV antibiotics ,    Will  They  Be  extended ?// please call

## 2017-07-13 NOTE — TELEPHONE ENCOUNTER
Contacted Lia with [facility]. Informed her per Dr Perez last office visit note IV antibiotics will be continued until her follow up appointment 7/17/17. Lia verbalized understanding.

## 2017-07-14 ENCOUNTER — TELEPHONE (OUTPATIENT)
Dept: ORTHOPEDICS | Facility: CLINIC | Age: 37
End: 2017-07-14

## 2017-07-14 NOTE — TELEPHONE ENCOUNTER
----- Message from Annalee Juarez sent at 7/14/2017 11:48 AM CDT -----  Contact: [phone]  Patient is requesting a call back from the nurse stated her foot have fever in it and its sore, it hurt.    Please call the patient upon request at phone number [phone].

## 2017-07-14 NOTE — TELEPHONE ENCOUNTER
Contacted pt. Pt states Foot is sore due to packing of wound at her recent wound care appointment. Pt also states fever of 99* this morning at time of blood work this morning. [facility] suggest she notify our office and advised if fever increases to go to ED. Advised pt I would notify Dr Perez however he is in sx today. Advised if fever increases prior to follow up with Dr Perez Monday pt should go to ED. Pt verbalized understanding.

## 2017-07-17 ENCOUNTER — OFFICE VISIT (OUTPATIENT)
Dept: ORTHOPEDICS | Facility: CLINIC | Age: 37
End: 2017-07-17
Payer: MEDICAID

## 2017-07-17 VITALS — WEIGHT: 180 LBS | BODY MASS INDEX: 28.93 KG/M2 | HEIGHT: 66 IN

## 2017-07-17 DIAGNOSIS — T84.7XXD WOUND INFECTION COMPLICATING HARDWARE, SUBSEQUENT ENCOUNTER: ICD-10-CM

## 2017-07-17 DIAGNOSIS — Z87.81 S/P ORIF (OPEN REDUCTION INTERNAL FIXATION) FRACTURE: Primary | ICD-10-CM

## 2017-07-17 DIAGNOSIS — Z98.890 S/P ORIF (OPEN REDUCTION INTERNAL FIXATION) FRACTURE: Primary | ICD-10-CM

## 2017-07-17 DIAGNOSIS — Z98.890 S/P HARDWARE REMOVAL: ICD-10-CM

## 2017-07-17 PROCEDURE — 99024 POSTOP FOLLOW-UP VISIT: CPT | Mod: ,,, | Performed by: ORTHOPAEDIC SURGERY

## 2017-07-17 PROCEDURE — 99212 OFFICE O/P EST SF 10 MIN: CPT | Mod: PBBFAC,PO | Performed by: ORTHOPAEDIC SURGERY

## 2017-07-17 PROCEDURE — 99999 PR PBB SHADOW E&M-EST. PATIENT-LVL II: CPT | Mod: PBBFAC,,, | Performed by: ORTHOPAEDIC SURGERY

## 2017-07-17 NOTE — PROGRESS NOTES
"DATE: 7/17/2017  PATIENT: Priscilla Wall    Attending Physician: Hari Perez M.D.    HISTORY:  Priscilla Wall is a 36 y.o. female who returns for follow up evaluation of  her right fifth metatarsal infected nonunion.  She is been on antibiotics now for 6 weeks.  She still has a wound laterally has been going to the [facility] wound care clinic.  It started local wound care.  Recent sedimentation rate is down to 8.  CRP was not performed.  Pain is reported at 2/10.  She continues to use the Cam Walker for ambulation.    PMH/PSH/FamHx/SocHx:  Reviewed and unchanged from prior visit    ROS:  Constitution: Negative for chills, fever, and sweats. Negative for unexplained weight loss.  HENT: Negative for headaches and blurry vision.   Cardiovascular: Negative for chest pain, irregular heartbeat, leg swelling and palpitations.   Respiratory: Negative for cough and shortness of breath.   Gastrointestinal: Negative for abdominal pain, heartburn, nausea and vomiting.   Genitourinary: Negative for bladder incontinence and dysuria.   Musculoskeletal: Negative for systemic arthritis, joint swelling, muscle weakness and myalgias.   Neurological: Negative for numbness.   Psychiatric/Behavioral: Negative for depression.   Endocrine: Negative for polyuria.   Hematologic/Lymphatic: Negative for bleeding disorders.  Skin: Negative for poor wound healing.       EXAM:  Ht 5' 6" (1.676 m)   Wt 81.6 kg (180 lb)   BMI 29.05 kg/m²   Healthy-appearing female no acute distress.  Examination right foot still reveals a wound over the previous incision site.  However it is smaller than at the last visit and appears to be closing..  It does appear to show granulation tissue.  No exposed bone identified.  Sensation intact to the toes.    IMAGING:   No x-rays are performed today.    ASSESSMENT:  Status post irrigation debridement and hardware removal with wound closure right fifth metatarsal infected nonunion, " 6/2/17.    PLAN:  The implications of the patient's evolution of symptoms and findings were explained to the patient and her mother in detail.  Infection doesn't seem to be clearing as evidenced by decreased sedimentation rate.  However she does have an open wound and the like.  IV antibiotics for 1 more week.  I like to see her back in one week's time for reexam to see if the wound is closed.  She'll continue with the intravenous antibiotics.  Should continue Cam Walker mobilization.  Follow-up next week for reexam of the wound as well as x-rays of the right foot.          This note was dictated using voice recognition software and may contain grammatical errors

## 2017-07-25 DIAGNOSIS — Z98.890 S/P HARDWARE REMOVAL: Primary | ICD-10-CM

## 2017-07-26 ENCOUNTER — TELEPHONE (OUTPATIENT)
Dept: ORTHOPEDICS | Facility: CLINIC | Age: 37
End: 2017-07-26

## 2017-07-26 NOTE — TELEPHONE ENCOUNTER
----- Message from RT Nicky sent at 7/26/2017  9:37 AM CDT -----  Contact: Dena,[phone] [facility]  Dena,[phone] [facility], requesting a call back concerning the pt's antibiotics, thanks.

## 2017-07-26 NOTE — TELEPHONE ENCOUNTER
Contacted Lia at [facility]. Informed her pt has an appointment tomorrow 7/27/17 with Dr Perez. At that time it will be determined if IV antibiotics will be continued. Lia verbalized understanding.

## 2017-07-27 ENCOUNTER — OFFICE VISIT (OUTPATIENT)
Dept: ORTHOPEDICS | Facility: CLINIC | Age: 37
End: 2017-07-27
Payer: MEDICAID

## 2017-07-27 ENCOUNTER — HOSPITAL ENCOUNTER (OUTPATIENT)
Dept: RADIOLOGY | Facility: HOSPITAL | Age: 37
Discharge: HOME | End: 2017-07-27
Attending: ORTHOPAEDIC SURGERY
Payer: MEDICAID

## 2017-07-27 ENCOUNTER — TELEPHONE (OUTPATIENT)
Dept: ORTHOPEDICS | Facility: CLINIC | Age: 37
End: 2017-07-27

## 2017-07-27 VITALS — WEIGHT: 180 LBS | HEIGHT: 66 IN | BODY MASS INDEX: 28.93 KG/M2

## 2017-07-27 DIAGNOSIS — Z87.81 S/P ORIF (OPEN REDUCTION INTERNAL FIXATION) FRACTURE: ICD-10-CM

## 2017-07-27 DIAGNOSIS — Z98.890 S/P HARDWARE REMOVAL: ICD-10-CM

## 2017-07-27 DIAGNOSIS — Z98.890 S/P ORIF (OPEN REDUCTION INTERNAL FIXATION) FRACTURE: ICD-10-CM

## 2017-07-27 DIAGNOSIS — Z98.890 S/P HARDWARE REMOVAL: Primary | ICD-10-CM

## 2017-07-27 DIAGNOSIS — T84.7XXD WOUND INFECTION COMPLICATING HARDWARE, SUBSEQUENT ENCOUNTER: ICD-10-CM

## 2017-07-27 PROCEDURE — 73630 X-RAY EXAM OF FOOT: CPT | Mod: 26,RT,, | Performed by: RADIOLOGY

## 2017-07-27 PROCEDURE — 99999 PR PBB SHADOW E&M-EST. PATIENT-LVL II: CPT | Mod: PBBFAC,,, | Performed by: ORTHOPAEDIC SURGERY

## 2017-07-27 PROCEDURE — 99212 OFFICE O/P EST SF 10 MIN: CPT | Mod: PBBFAC,25,PO | Performed by: ORTHOPAEDIC SURGERY

## 2017-07-27 PROCEDURE — 99024 POSTOP FOLLOW-UP VISIT: CPT | Mod: ,,, | Performed by: ORTHOPAEDIC SURGERY

## 2017-07-27 RX ORDER — MINOCYCLINE HYDROCHLORIDE 100 MG/1
100 CAPSULE ORAL 2 TIMES DAILY
Qty: 30 CAPSULE | Refills: 1 | Status: SHIPPED | OUTPATIENT
Start: 2017-07-27 | End: 2021-02-24

## 2017-07-27 NOTE — TELEPHONE ENCOUNTER
Contacted [facility]. Informed per Dr Perez IV antibiotics and PICC line should be discontinued at pt appointment today 7/27/17. [facility] unavailable left message with .  verbalized understanding.

## 2017-07-27 NOTE — TELEPHONE ENCOUNTER
----- Message from Margarita Montoya sent at 7/27/2017 11:58 AM CDT -----  Contact: [facility]  Needs to know if patient needs to stop IV Daptomycin 490 mg IV daily, if you do want her to stop, do you want PIC line removed.  Please call back at

## 2017-07-27 NOTE — PROGRESS NOTES
"DATE: 7/27/2017  PATIENT: Priscilla Wall    Attending Physician: Hari Perez M.D.    HISTORY:  Priscilla Wall is a 36 y.o. female who returns for follow up evaluation of  her right foot.  She is status post irrigation debridement and hardware removal for infected fifth metatarsal nonunion.  He is been on antibiotics for nearly 8 weeks.  She is been going to the [facility] wound care clinic.  She notes the wound continues to close but is still present.  Her sedimentation rate and CRP have decreased to essentially normal levels.  Pain is still reported at 2/10.  She is a Cam Walker for ambulation.  She is been on daptomycin IV.    PMH/PSH/FamHx/SocHx:  Reviewed and unchanged from prior visit    ROS:  Constitution: Negative for chills, fever, and sweats. Negative for unexplained weight loss.  HENT: Negative for headaches and blurry vision.   Cardiovascular: Negative for chest pain, irregular heartbeat, leg swelling and palpitations.   Respiratory: Negative for cough and shortness of breath.   Gastrointestinal: Negative for abdominal pain, heartburn, nausea and vomiting.   Genitourinary: Negative for bladder incontinence and dysuria.   Musculoskeletal: Negative for systemic arthritis, joint swelling, muscle weakness and myalgias.   Neurological: Negative for numbness.   Psychiatric/Behavioral: Negative for depression.   Endocrine: Negative for polyuria.   Hematologic/Lymphatic: Negative for bleeding disorders.  Skin: Negative for poor wound healing.       EXAM:  Ht 5' 6" (1.676 m)   Wt 81.6 kg (180 lb)   BMI 29.05 kg/m²   Healthy-appearing female no acute distress.  Examination right foot still reveals a wound over the previous incision site.  However it continues to decrease in size.  There is healthier granulation tissue noted at the last visit.  No active drainage noted.  Sensation intact to the toes.    IMAGING:   X-rays of the right foot are performed and reviewed.  Fifth metatarsal nonunion " identified.  No appreciable interval changes    ASSESSMENT:  Status post irrigation debridement and hardware removal with wound closure right fifth metatarsal infected nonunion, 6/2/17.    PLAN:  The implications of the patient's evolution of symptoms and findings were explained to the patient in detail.  I've spoken to Dr. Lopez infectious disease was recommended switching her to minocycline 100 mg by mouth twice a day for 2-4 weeks.  Prescription sent to the pharmacy.  We'll discontinue the daptomycin and remove the PICC line.  She'll continue with the wound care clinic.  I like to see her back in 2 weeks' time for reexam.  No x-rays needed at the next visit    This note was dictated using voice recognition software and may contain grammatical errors

## 2017-07-28 ENCOUNTER — TELEPHONE (OUTPATIENT)
Dept: ORTHOPEDICS | Facility: CLINIC | Age: 37
End: 2017-07-28

## 2017-07-28 NOTE — TELEPHONE ENCOUNTER
----- Message from Lucille Ruiz sent at 7/28/2017  9:30 AM CDT -----  Contact: Geronimo // [facility] // [phone]  Geronimo left a voice mail message on 7/27 at 4:00 regarding patient's pic line.  Please call.

## 2017-07-28 NOTE — TELEPHONE ENCOUNTER
Contacted Geronimo with [facility]. Geronimo states that was an old message and disregard.

## 2017-08-17 ENCOUNTER — OFFICE VISIT (OUTPATIENT)
Dept: ORTHOPEDICS | Facility: CLINIC | Age: 37
End: 2017-08-17
Payer: MEDICAID

## 2017-08-17 VITALS — WEIGHT: 180 LBS | BODY MASS INDEX: 28.93 KG/M2 | HEIGHT: 66 IN

## 2017-08-17 DIAGNOSIS — T84.7XXD WOUND INFECTION COMPLICATING HARDWARE, SUBSEQUENT ENCOUNTER: ICD-10-CM

## 2017-08-17 DIAGNOSIS — Z98.890 S/P HARDWARE REMOVAL: Primary | ICD-10-CM

## 2017-08-17 PROCEDURE — 99212 OFFICE O/P EST SF 10 MIN: CPT | Mod: PBBFAC,PO | Performed by: ORTHOPAEDIC SURGERY

## 2017-08-17 PROCEDURE — 99024 POSTOP FOLLOW-UP VISIT: CPT | Mod: ,,, | Performed by: ORTHOPAEDIC SURGERY

## 2017-08-17 PROCEDURE — 99999 PR PBB SHADOW E&M-EST. PATIENT-LVL II: CPT | Mod: PBBFAC,,, | Performed by: ORTHOPAEDIC SURGERY

## 2017-08-17 NOTE — PROGRESS NOTES
"DATE: 8/17/2017  PATIENT: Priscilla Wall    Attending Physician: Hari Perez M.D.    HISTORY:  Priscilla Wall is a 36 y.o. female who returns for follow up evaluation of  her right foot.  She is status post irrigation and debridement and hardware removal of infected nonunion of the fifth metatarsal fracture, 6/2/17.  She's currently on oral antibiotics.  She continues to go to wound care and the wound continues to close but is not fully healed.  She returns for follow-up exam.    PMH/PSH/FamHx/SocHx:  Reviewed and unchanged from prior visit    ROS:  Constitution: Negative for chills, fever, and sweats. Negative for unexplained weight loss.  HENT: Negative for headaches and blurry vision.   Cardiovascular: Negative for chest pain, irregular heartbeat, leg swelling and palpitations.   Respiratory: Negative for cough and shortness of breath.   Gastrointestinal: Negative for abdominal pain, heartburn, nausea and vomiting.   Genitourinary: Negative for bladder incontinence and dysuria.   Musculoskeletal: Negative for systemic arthritis, joint swelling, muscle weakness and myalgias.   Neurological: Negative for numbness.   Psychiatric/Behavioral: Negative for depression.   Endocrine: Negative for polyuria.   Hematologic/Lymphatic: Negative for bleeding disorders.  Skin: Negative for poor wound healing.       EXAM:  Ht 5' 6" (1.676 m)   Wt 81.6 kg (180 lb)   BMI 29.05 kg/m²   Healthy-appearing female no acute distress.  Examination right foot still reveals a wound over the previous incision site.  However it continues to decrease in size and is now approximately 1 x 1.5 cm.  There is healthier granulation tissue then noted at the last visit.  No active drainage noted.  Sensation intact to the toes.    IMAGING:   No x-rays are performed today.    ASSESSMENT:  Status post irrigation debridement and hardware removal with wound closure right fifth metatarsal infected nonunion, 6/2/17.    PLAN:  The implications of the " patient's evolution of symptoms and findings were explained to the patient in detail.. Al I have explained to Adiel that we really can't proceed any further until the wound is healed.   with the minocycline for at least 2 more weeks.  She'll continue going to the wound care center for local wound care.  I will see her back in 3 weeks' time for reexam.  X-rays of the right foot to be performed at the next visit      This note was dictated using voice recognition software and may contain grammatical errors

## 2017-09-12 DIAGNOSIS — Z98.890 S/P HARDWARE REMOVAL: Primary | ICD-10-CM

## 2017-09-14 ENCOUNTER — OFFICE VISIT (OUTPATIENT)
Dept: ORTHOPEDICS | Facility: CLINIC | Age: 37
End: 2017-09-14
Payer: MEDICAID

## 2017-09-14 ENCOUNTER — HOSPITAL ENCOUNTER (OUTPATIENT)
Dept: RADIOLOGY | Facility: HOSPITAL | Age: 37
Discharge: HOME | End: 2017-09-14
Attending: ORTHOPAEDIC SURGERY
Payer: MEDICAID

## 2017-09-14 DIAGNOSIS — Z98.890 S/P HARDWARE REMOVAL: Primary | ICD-10-CM

## 2017-09-14 DIAGNOSIS — Z98.890 S/P HARDWARE REMOVAL: ICD-10-CM

## 2017-09-14 DIAGNOSIS — S92.901K NONUNION OF FOOT FRACTURE, RIGHT: ICD-10-CM

## 2017-09-14 DIAGNOSIS — T84.7XXD WOUND INFECTION COMPLICATING HARDWARE, SUBSEQUENT ENCOUNTER: ICD-10-CM

## 2017-09-14 PROCEDURE — 73630 X-RAY EXAM OF FOOT: CPT | Mod: TC,PO,RT

## 2017-09-14 PROCEDURE — 99211 OFF/OP EST MAY X REQ PHY/QHP: CPT | Mod: PBBFAC,25,PO | Performed by: ORTHOPAEDIC SURGERY

## 2017-09-14 PROCEDURE — 99213 OFFICE O/P EST LOW 20 MIN: CPT | Mod: S$PBB,,, | Performed by: ORTHOPAEDIC SURGERY

## 2017-09-14 PROCEDURE — 73630 X-RAY EXAM OF FOOT: CPT | Mod: 26,RT,, | Performed by: RADIOLOGY

## 2017-09-14 PROCEDURE — 99999 PR PBB SHADOW E&M-EST. PATIENT-LVL I: CPT | Mod: PBBFAC,,, | Performed by: ORTHOPAEDIC SURGERY

## 2017-09-14 PROCEDURE — 3008F BODY MASS INDEX DOCD: CPT | Mod: ,,, | Performed by: ORTHOPAEDIC SURGERY

## 2017-09-14 NOTE — PROGRESS NOTES
DATE: 9/14/2017  PATIENT: Priscilla Wall    Attending Physician: Hari Perez M.D.    HISTORY:  Priscilla Wall is a 36 y.o. female who returns for follow up evaluation of  her right fifth metatarsal infected nonunion.  She underwent hardware removal irrigation debridement, 6/2/17.  She's been off the intravenous and oral antibiotics.  She continues to go to wound care and reports the wound is almost closed.  She continues to use the Cam Walker.  Pain is reported at 0/10 today.    PMH/PSH/FamHx/SocHx:  Reviewed and unchanged from prior visit    ROS:  Constitution: Negative for chills, fever, and sweats. Negative for unexplained weight loss.  HENT: Negative for headaches and blurry vision.   Cardiovascular: Negative for chest pain, irregular heartbeat, leg swelling and palpitations.   Respiratory: Negative for cough and shortness of breath.   Gastrointestinal: Negative for abdominal pain, heartburn, nausea and vomiting.   Genitourinary: Negative for bladder incontinence and dysuria.   Musculoskeletal: Negative for systemic arthritis, joint swelling, muscle weakness and myalgias.   Neurological: Negative for numbness.   Psychiatric/Behavioral: Negative for depression.   Endocrine: Negative for polyuria.   Hematologic/Lymphatic: Negative for bleeding disorders.  Skin: Negative for poor wound healing.       EXAM:  There were no vitals taken for this visit.  Healthy-appearing female no acute distress.  Examination of the right foot reveals the wound is nearly completely healed.  There is a small punctate area of open skin.  No drainage noted.  No significant erythema.  Mild tenderness palpation about the proximal fifth metatarsal.  Sensation intact to the foot.    IMAGING:   X-rays of the right foot are performed and personally reviewed.  Proximal fifth metatarsal nonunion identified.    ASSESSMENT:  Status post irrigation debridement and hardware removal with wound closure right fifth metatarsal infected nonunion,  6/2/17.    PLAN:  The implications of the patient's evolution of symptoms and findings were explained to the patient in detail.  I have explained to Adiel that x-rays show no evidence of any healing.  It appears she still has a nonunion.  However I've explained that I would like to wait until she is off all antibiotics for at least 4 weeks prior to considering revision ORIF.  She states she is going back on antibiotics gastrointestinal problems.  She states the antibiotics will be for 7-10 days.  Therefore I would like to see her back in 6 weeks' time for reexam and x-rays of the right foot.  At that time we will discuss timing for revision ORIF should no obvious signs of recurrent infection be present.          This note was dictated using voice recognition software and may contain grammatical errors

## 2017-10-18 DIAGNOSIS — Z98.890 S/P HARDWARE REMOVAL: Primary | ICD-10-CM

## 2017-10-19 ENCOUNTER — OFFICE VISIT (OUTPATIENT)
Dept: ORTHOPEDICS | Facility: CLINIC | Age: 37
End: 2017-10-19
Payer: MEDICAID

## 2017-10-19 ENCOUNTER — HOSPITAL ENCOUNTER (OUTPATIENT)
Dept: RADIOLOGY | Facility: HOSPITAL | Age: 37
Discharge: HOME | End: 2017-10-19
Attending: ORTHOPAEDIC SURGERY
Payer: MEDICAID

## 2017-10-19 DIAGNOSIS — Z98.890 S/P HARDWARE REMOVAL: Primary | ICD-10-CM

## 2017-10-19 DIAGNOSIS — Z98.890 S/P HARDWARE REMOVAL: ICD-10-CM

## 2017-10-19 DIAGNOSIS — S92.352K CLOSED DISPLACED FRACTURE OF FIFTH METATARSAL BONE OF LEFT FOOT WITH NONUNION, SUBSEQUENT ENCOUNTER: ICD-10-CM

## 2017-10-19 DIAGNOSIS — T84.7XXD WOUND INFECTION COMPLICATING HARDWARE, SUBSEQUENT ENCOUNTER: ICD-10-CM

## 2017-10-19 PROCEDURE — 99213 OFFICE O/P EST LOW 20 MIN: CPT | Mod: S$PBB,,, | Performed by: ORTHOPAEDIC SURGERY

## 2017-10-19 PROCEDURE — 73630 X-RAY EXAM OF FOOT: CPT | Mod: 26,RT,, | Performed by: RADIOLOGY

## 2017-10-19 PROCEDURE — 99212 OFFICE O/P EST SF 10 MIN: CPT | Mod: PBBFAC,25,PO | Performed by: ORTHOPAEDIC SURGERY

## 2017-10-19 PROCEDURE — 73630 X-RAY EXAM OF FOOT: CPT | Mod: TC,PO,RT

## 2017-10-19 PROCEDURE — 99999 PR PBB SHADOW E&M-EST. PATIENT-LVL II: CPT | Mod: PBBFAC,,, | Performed by: ORTHOPAEDIC SURGERY

## 2017-10-19 NOTE — PROGRESS NOTES
DATE: 10/19/2017  PATIENT: Priscilla Wall    Attending Physician: Hari Perez M.D.    HISTORY:  Priscilla Wall is a 36 y.o. female who returns for follow up evaluation of  her right fifth metatarsal infected nonunion.  She underwent irrigation debridement and hardware removal, 6/2/17.  She's been off the antibiotics now for nearly 2 months.  She states her wound is just about closed.  Her pain is slightly less.  Pain is 0/10 at rest and mildly increased with weightbearing.  She is ambulating in a shoe.    PMH/PSH/FamHx/SocHx:  Reviewed and unchanged from prior visit    ROS:  Constitution: Negative for chills, fever, and sweats. Negative for unexplained weight loss.  HENT: Negative for headaches and blurry vision.   Cardiovascular: Negative for chest pain, irregular heartbeat, leg swelling and palpitations.   Respiratory: Negative for cough and shortness of breath.   Gastrointestinal: Negative for abdominal pain, heartburn, nausea and vomiting.   Genitourinary: Negative for bladder incontinence and dysuria.   Musculoskeletal: Negative for systemic arthritis, joint swelling, muscle weakness and myalgias.   Neurological: Negative for numbness.   Psychiatric/Behavioral: Negative for depression.   Endocrine: Negative for polyuria.   Hematologic/Lymphatic: Negative for bleeding disorders.  Skin: Negative for poor wound healing.       EXAM:  There were no vitals taken for this visit.  Healthy-appearing female no acute distress.  Examination of the right foot reveals the wound is nearly completely healed.  There is a small punctate scab over the previous wound area.  No drainage noted.  No erythema.   minimal tenderness palpation about the proximal fifth metatarsal.  Sensation intact to the foot.    IMAGING:   X-rays of the right foot show the nonunion site is maintained alignment.  There does appear to be groundglass appearance consistent with interval healing compared to the previous film.    ASSESSMENT:  Status post  irrigation debridement and hardware removal with wound closure right fifth metatarsal infected nonunion, 6/2/17.    PLAN:  The implications of the patient's evolution of symptoms and findings were explained to the patient in detail.  I have explained to Adiel that x-rays do suggest interval healing.  I recommended continued nonoperative management.  She may increase her activities as tolerated.  I like to see her back in 6 weeks' time for reexam and follow-up x-rays of the right foot          This note was dictated using voice recognition software. Please excuse any grammatical or typographical errors.

## 2017-11-28 DIAGNOSIS — M79.671 RIGHT FOOT PAIN: Primary | ICD-10-CM

## 2017-12-29 ENCOUNTER — TELEPHONE (OUTPATIENT)
Dept: ORTHOPEDICS | Facility: CLINIC | Age: 37
End: 2017-12-29

## 2017-12-29 NOTE — TELEPHONE ENCOUNTER
----- Message from Mikki Ramsey sent at 12/28/2017  4:26 PM CST -----  Patient is calling to make an appointment but she has medicaid and has not been seen since 10/19/17 for a post op. She has canceled numerous appointments. I did not know if she still can be seen or does she need a referral. Please call to advise at [phone]. The previous appointments were scheduled under [facility].

## 2018-01-08 DIAGNOSIS — M79.671 RIGHT FOOT PAIN: Primary | ICD-10-CM

## 2018-01-15 ENCOUNTER — OFFICE VISIT (OUTPATIENT)
Dept: ORTHOPEDICS | Facility: CLINIC | Age: 38
End: 2018-01-15
Payer: MEDICAID

## 2018-01-15 ENCOUNTER — HOSPITAL ENCOUNTER (OUTPATIENT)
Dept: RADIOLOGY | Facility: HOSPITAL | Age: 38
Discharge: HOME | End: 2018-01-15
Attending: ORTHOPAEDIC SURGERY
Payer: MEDICAID

## 2018-01-15 VITALS — WEIGHT: 180 LBS | HEIGHT: 66 IN | BODY MASS INDEX: 28.93 KG/M2

## 2018-01-15 DIAGNOSIS — M79.671 RIGHT FOOT PAIN: ICD-10-CM

## 2018-01-15 DIAGNOSIS — Z98.890 S/P HARDWARE REMOVAL: Primary | ICD-10-CM

## 2018-01-15 DIAGNOSIS — T84.7XXD WOUND INFECTION COMPLICATING HARDWARE, SUBSEQUENT ENCOUNTER: ICD-10-CM

## 2018-01-15 DIAGNOSIS — S92.901K NONUNION OF FOOT FRACTURE, RIGHT: ICD-10-CM

## 2018-01-15 PROCEDURE — 99212 OFFICE O/P EST SF 10 MIN: CPT | Mod: PBBFAC,25,PN | Performed by: ORTHOPAEDIC SURGERY

## 2018-01-15 PROCEDURE — 73630 X-RAY EXAM OF FOOT: CPT | Mod: TC,FY,PO,RT

## 2018-01-15 PROCEDURE — 73630 X-RAY EXAM OF FOOT: CPT | Mod: 26,RT,, | Performed by: RADIOLOGY

## 2018-01-15 PROCEDURE — 99213 OFFICE O/P EST LOW 20 MIN: CPT | Mod: S$PBB,,, | Performed by: ORTHOPAEDIC SURGERY

## 2018-01-15 PROCEDURE — 99999 PR PBB SHADOW E&M-EST. PATIENT-LVL II: CPT | Mod: PBBFAC,,, | Performed by: ORTHOPAEDIC SURGERY

## 2018-01-16 NOTE — PROGRESS NOTES
"DATE: 1/15/2018  PATIENT: Priscilla Wall    Attending Physician: Hari Perez M.D.    HISTORY:  Priscilla Wall is a 37 y.o. female who returns for follow up evaluation of  her right fifth metatarsal infected nonunion.  She underwent irrigation debridement and hardware removal, 6/2/17.  She's been off antibiotics.  She still notes pain on the lateral aspect of her foot.  Pain is reported at 2/10 but increases with ambulation.  She presents for further treatment recommendations    PMH/PSH/FamHx/SocHx:  Reviewed and unchanged from prior visit    ROS:  Constitution: Negative for chills, fever, and sweats. Negative for unexplained weight loss.  HENT: Negative for headaches and blurry vision.   Cardiovascular: Negative for chest pain, irregular heartbeat, leg swelling and palpitations.   Respiratory: Negative for cough and shortness of breath.   Gastrointestinal: Negative for abdominal pain, heartburn, nausea and vomiting.   Genitourinary: Negative for bladder incontinence and dysuria.   Musculoskeletal: Negative for systemic arthritis, joint swelling, muscle weakness and myalgias.   Neurological: Negative for numbness.   Psychiatric/Behavioral: Negative for depression.   Endocrine: Negative for polyuria.   Hematologic/Lymphatic: Negative for bleeding disorders.  Skin: Negative for poor wound healing.       EXAM:  Ht 5' 6" (1.676 m)   Wt 81.6 kg (180 lb)   BMI 29.05 kg/m²   Healthy-appearing female no acute distress.  Examination of the right foot reveals the wound is completely healed.  No signs of infection. No drainage noted.  No erythema.   minimal tenderness palpation about the proximal fifth metatarsal.  Sensation intact to the foot.    IMAGING:   X-rays of the right foot are personally reviewed.  Fifth metatarsal nonunion still identified.    ASSESSMENT:  Status post irrigation debridement and hardware removal with wound closure right fifth metatarsal infected nonunion, 6/2/17.    PLAN:  The implications of " the patient's evolution of symptoms and findings were explained to the patient in detail.  I have explained that clearly the fracture is not healed.  As she has been off antibiotics long enough, I recommended consideration of open reduction internal fixation.  However I recommended ordering a differential, sedimentation rate, CRP to make sure there is no active infection.  Should those lab values be within normal limits, contact the office is ready to proceed with ORIF.    This note was dictated using voice recognition software. Please excuse any grammatical or typographical errors.

## 2018-01-24 ENCOUNTER — LAB VISIT (OUTPATIENT)
Dept: LAB | Facility: HOSPITAL | Age: 38
End: 2018-01-24
Attending: ORTHOPAEDIC SURGERY
Payer: MEDICAID

## 2018-01-24 DIAGNOSIS — Z98.890 S/P HARDWARE REMOVAL: ICD-10-CM

## 2018-01-24 LAB
BASOPHILS # BLD AUTO: 0.05 K/UL
BASOPHILS NFR BLD: 0.6 %
CRP SERPL-MCNC: 1.2 MG/L
DIFFERENTIAL METHOD: NORMAL
EOSINOPHIL # BLD AUTO: 0.2 K/UL
EOSINOPHIL NFR BLD: 3 %
ERYTHROCYTE [DISTWIDTH] IN BLOOD BY AUTOMATED COUNT: 11.9 %
ERYTHROCYTE [SEDIMENTATION RATE] IN BLOOD BY WESTERGREN METHOD: 6 MM/HR
HCT VFR BLD AUTO: 40 %
HGB BLD-MCNC: 12.8 G/DL
IMM GRANULOCYTES # BLD AUTO: 0.04 K/UL
IMM GRANULOCYTES NFR BLD AUTO: 0.5 %
LYMPHOCYTES # BLD AUTO: 1.6 K/UL
LYMPHOCYTES NFR BLD: 20 %
MCH RBC QN AUTO: 29.6 PG
MCHC RBC AUTO-ENTMCNC: 32 G/DL
MCV RBC AUTO: 93 FL
MONOCYTES # BLD AUTO: 0.7 K/UL
MONOCYTES NFR BLD: 8.2 %
NEUTROPHILS # BLD AUTO: 5.5 K/UL
NEUTROPHILS NFR BLD: 67.7 %
NRBC BLD-RTO: 0 /100 WBC
PLATELET # BLD AUTO: 348 K/UL
PMV BLD AUTO: 11.6 FL
RBC # BLD AUTO: 4.32 M/UL
WBC # BLD AUTO: 8.09 K/UL

## 2018-01-24 PROCEDURE — 36415 COLL VENOUS BLD VENIPUNCTURE: CPT | Mod: PO

## 2018-01-24 PROCEDURE — 85025 COMPLETE CBC W/AUTO DIFF WBC: CPT

## 2018-01-24 PROCEDURE — 85651 RBC SED RATE NONAUTOMATED: CPT | Mod: PO

## 2018-01-24 PROCEDURE — 86140 C-REACTIVE PROTEIN: CPT

## 2018-02-07 ENCOUNTER — TELEPHONE (OUTPATIENT)
Dept: ORTHOPEDICS | Facility: CLINIC | Age: 38
End: 2018-02-07

## 2018-02-07 NOTE — TELEPHONE ENCOUNTER
Contacted pt. Scheduled appointment with Dr Perez to sign sx consents. Confirmed appointment date, time, and location. Pt verbalized understanding.

## 2018-02-12 ENCOUNTER — TELEPHONE (OUTPATIENT)
Dept: ORTHOPEDICS | Facility: CLINIC | Age: 38
End: 2018-02-12

## 2018-06-05 DIAGNOSIS — M79.671 RIGHT FOOT PAIN: Primary | ICD-10-CM

## 2018-06-07 ENCOUNTER — OFFICE VISIT (OUTPATIENT)
Dept: ORTHOPEDICS | Facility: CLINIC | Age: 38
End: 2018-06-07
Payer: MEDICAID

## 2018-06-07 ENCOUNTER — HOSPITAL ENCOUNTER (OUTPATIENT)
Dept: RADIOLOGY | Facility: HOSPITAL | Age: 38
Discharge: HOME | End: 2018-06-07
Attending: ORTHOPAEDIC SURGERY
Payer: MEDICAID

## 2018-06-07 VITALS — WEIGHT: 180 LBS | HEIGHT: 66 IN | BODY MASS INDEX: 28.93 KG/M2

## 2018-06-07 DIAGNOSIS — M79.671 RIGHT FOOT PAIN: ICD-10-CM

## 2018-06-07 DIAGNOSIS — S92.901K NONUNION OF FOOT FRACTURE, RIGHT: ICD-10-CM

## 2018-06-07 DIAGNOSIS — S90.121A CONTUSION OF LESSER TOE OF RIGHT FOOT WITHOUT DAMAGE TO NAIL, INITIAL ENCOUNTER: Primary | ICD-10-CM

## 2018-06-07 PROCEDURE — 99213 OFFICE O/P EST LOW 20 MIN: CPT | Mod: S$PBB,,, | Performed by: ORTHOPAEDIC SURGERY

## 2018-06-07 PROCEDURE — 99999 PR PBB SHADOW E&M-EST. PATIENT-LVL II: CPT | Mod: PBBFAC,,, | Performed by: ORTHOPAEDIC SURGERY

## 2018-06-07 PROCEDURE — 73630 X-RAY EXAM OF FOOT: CPT | Mod: TC,PO,RT

## 2018-06-07 PROCEDURE — 73630 X-RAY EXAM OF FOOT: CPT | Mod: 26,RT,, | Performed by: RADIOLOGY

## 2018-06-07 PROCEDURE — 99212 OFFICE O/P EST SF 10 MIN: CPT | Mod: PBBFAC,25,PN | Performed by: ORTHOPAEDIC SURGERY

## 2018-06-08 NOTE — PROGRESS NOTES
"DATE: 6/7/2018  PATIENT: Priscilla Wall    Attending Physician: Hari Perez M.D.    HISTORY:  Priscilla Wall is a 37 y.o. female who returns for follow up evaluation of  right foot.  She has a good metatarsal nonunion.  She still notes her symptoms are unchanged and still symptomatic.  However she states that earlier this week she jammed her small toe and thinks she may have broken it.  She notes swelling and bruising.  She presents today for evaluation.  Pain is reported at 0/10 at rest but increases with ambulation    PMH/PSH/FamHx/SocHx:  Reviewed and unchanged from prior visit    ROS:  Constitution: Negative for chills, fever, and sweats. Negative for unexplained weight loss.  HENT: Negative for headaches and blurry vision.   Cardiovascular: Negative for chest pain, irregular heartbeat, leg swelling and palpitations.   Respiratory: Negative for cough and shortness of breath.   Gastrointestinal: Negative for abdominal pain, heartburn, nausea and vomiting.   Genitourinary: Negative for bladder incontinence and dysuria.   Musculoskeletal: Negative for systemic arthritis, joint swelling, muscle weakness and myalgias.   Neurological: Negative for numbness.   Psychiatric/Behavioral: Negative for depression.   Endocrine: Negative for polyuria.   Hematologic/Lymphatic: Negative for bleeding disorders.  Skin: Negative for poor wound healing.       EXAM:  Ht 5' 6" (1.676 m)   Wt 81.6 kg (180 lb)   BMI 29.05 kg/m²   Healthy-appearing female no acute distress.  Examination of the right foot reveals the wound is completely healed.  No signs of infection. No drainage noted.  No erythema.   minimal tenderness palpation about the proximal fifth metatarsal.  Sensation intact to the foot.  Examination of the small toe reveals moderate swelling and some ecchymosis.  Alignment is within normal limits    IMAGING:   X-rays of right foot are personally reviewed.  Fifth metatarsal nonunion still identified and " unchanged.  Soft tissue swelling about the small toe without obvious fracture identified    ASSESSMENT:  Contusion right small toe  Status post irrigation debridement and hardware removal with wound closure right fifth metatarsal infected nonunion, 6/2/17.       PLAN:  The implications of the patient's evolution of symptoms and findings were explained to the patient in detail.  I have explained to Adiel that there is no fracture of the small toe.  Treatment is symptomatic and supportive care.  With regards to the fifth metatarsal nonunion, we again discussed revision ORIF.. All questions answered and the patient wishes to look at her schedule to see when she like to proceed with ORIF.  She'll contact the office 3-4 weeks prior to her desired surgical date to discuss the procedure more detail.          This note was dictated using voice recognition software. Please excuse any grammatical or typographical errors.

## 2019-04-27 ENCOUNTER — NURSE TRIAGE (OUTPATIENT)
Dept: ADMINISTRATIVE | Facility: CLINIC | Age: 39
End: 2019-04-27

## 2019-04-27 NOTE — TELEPHONE ENCOUNTER
Reason for Disposition   Caller has medication question, adult has minor symptoms, caller declines triage, and triager answers question     Patient thinks she has pus from an extraction. She wanted an antibiotic.  She has amoxicillin.  Advised to call the dentist in am or go to [facility] or ER  Mother states the patient is sleeping now.    Protocols used: MEDICATION QUESTION CALL-A-AH

## 2019-07-12 NOTE — TELEPHONE ENCOUNTER
----- Message from Wei Carroll sent at 2/7/2018 12:08 PM CST -----  Contact: self   Placed call to pod, patient want to speak with a nurse regarding test results and scheduling surgery please call back at [phone] (home)      Patient keeps pushing refill for phentermine without an appt, patient hasnt been seen since 12/2018. I spoke with Dr izaguirre and as stated in previous msgs, Dr Trevino answer remains the tremaine and firm. An appointment will be needed if patient wants to proceed with medication.    Message relayed through [employer].

## 2020-07-20 LAB
PAP RECOMMENDATION EXT: NORMAL
PAP SMEAR: NORMAL

## 2021-01-14 LAB
HCV AB SER-ACNC: NEGATIVE
HIV 1+2 AB+HIV1 P24 AG SERPL QL IA: NEGATIVE

## 2021-02-08 ENCOUNTER — CLINICAL SUPPORT (OUTPATIENT)
Dept: URGENT CARE | Facility: CLINIC | Age: 41
End: 2021-02-08
Payer: MEDICARE

## 2021-02-08 DIAGNOSIS — Z03.818 ENCOUNTER FOR OBSERVATION FOR SUSPECTED EXPOSURE TO OTHER BIOLOGICAL AGENTS RULED OUT: Primary | ICD-10-CM

## 2021-02-08 LAB
CTP QC/QA: YES
SARS-COV-2 RDRP RESP QL NAA+PROBE: NEGATIVE

## 2021-02-08 PROCEDURE — 99211 PR OFFICE/OUTPT VISIT, EST, LEVL I: ICD-10-PCS | Mod: S$GLB,CS,, | Performed by: FAMILY MEDICINE

## 2021-02-08 PROCEDURE — U0002: ICD-10-PCS | Mod: QW,CR,S$GLB, | Performed by: FAMILY MEDICINE

## 2021-02-08 PROCEDURE — 99211 OFF/OP EST MAY X REQ PHY/QHP: CPT | Mod: S$GLB,CS,, | Performed by: FAMILY MEDICINE

## 2021-02-08 PROCEDURE — U0002 COVID-19 LAB TEST NON-CDC: HCPCS | Mod: QW,CR,S$GLB, | Performed by: FAMILY MEDICINE

## 2021-02-10 ENCOUNTER — OFFICE VISIT (OUTPATIENT)
Dept: URGENT CARE | Facility: CLINIC | Age: 41
End: 2021-02-10
Payer: MEDICARE

## 2021-02-10 VITALS
TEMPERATURE: 98 F | BODY MASS INDEX: 28.93 KG/M2 | HEIGHT: 66 IN | WEIGHT: 180 LBS | HEART RATE: 74 BPM | OXYGEN SATURATION: 98 % | SYSTOLIC BLOOD PRESSURE: 137 MMHG | DIASTOLIC BLOOD PRESSURE: 89 MMHG

## 2021-02-10 DIAGNOSIS — Z20.822 CLOSE EXPOSURE TO COVID-19 VIRUS: ICD-10-CM

## 2021-02-10 DIAGNOSIS — J02.9 SORE THROAT: Primary | ICD-10-CM

## 2021-02-10 LAB
CTP QC/QA: YES
MOLECULAR STREP A: NEGATIVE

## 2021-02-10 PROCEDURE — U0003 INFECTIOUS AGENT DETECTION BY NUCLEIC ACID (DNA OR RNA); SEVERE ACUTE RESPIRATORY SYNDROME CORONAVIRUS 2 (SARS-COV-2) (CORONAVIRUS DISEASE [COVID-19]), AMPLIFIED PROBE TECHNIQUE, MAKING USE OF HIGH THROUGHPUT TECHNOLOGIES AS DESCRIBED BY CMS-2020-01-R: HCPCS

## 2021-02-10 PROCEDURE — 99213 PR OFFICE/OUTPT VISIT, EST, LEVL III, 20-29 MIN: ICD-10-PCS | Mod: S$GLB,,, | Performed by: FAMILY MEDICINE

## 2021-02-10 PROCEDURE — 87651 POCT STREP A MOLECULAR: ICD-10-PCS | Mod: QW,S$GLB,, | Performed by: FAMILY MEDICINE

## 2021-02-10 PROCEDURE — U0005 INFEC AGEN DETEC AMPLI PROBE: HCPCS

## 2021-02-10 PROCEDURE — 87651 STREP A DNA AMP PROBE: CPT | Mod: QW,S$GLB,, | Performed by: FAMILY MEDICINE

## 2021-02-10 PROCEDURE — 99213 OFFICE O/P EST LOW 20 MIN: CPT | Mod: S$GLB,,, | Performed by: FAMILY MEDICINE

## 2021-02-12 LAB — SARS-COV-2 RNA RESP QL NAA+PROBE: NOT DETECTED

## 2021-05-12 ENCOUNTER — PATIENT MESSAGE (OUTPATIENT)
Dept: RESEARCH | Facility: HOSPITAL | Age: 41
End: 2021-05-12

## 2021-12-30 ENCOUNTER — OFFICE VISIT (OUTPATIENT)
Dept: FAMILY MEDICINE | Facility: CLINIC | Age: 41
End: 2021-12-30
Payer: MEDICARE

## 2021-12-30 VITALS
TEMPERATURE: 98 F | HEIGHT: 66 IN | BODY MASS INDEX: 31.34 KG/M2 | WEIGHT: 195 LBS | SYSTOLIC BLOOD PRESSURE: 128 MMHG | OXYGEN SATURATION: 99 % | DIASTOLIC BLOOD PRESSURE: 82 MMHG | HEART RATE: 68 BPM

## 2021-12-30 DIAGNOSIS — I10 ESSENTIAL HYPERTENSION: Primary | ICD-10-CM

## 2021-12-30 DIAGNOSIS — F32.A DEPRESSION, UNSPECIFIED DEPRESSION TYPE: ICD-10-CM

## 2021-12-30 DIAGNOSIS — F98.8 ATTENTION DEFICIT DISORDER, UNSPECIFIED HYPERACTIVITY PRESENCE: ICD-10-CM

## 2021-12-30 DIAGNOSIS — R79.9 ABNORMAL FINDING OF BLOOD CHEMISTRY, UNSPECIFIED: ICD-10-CM

## 2021-12-30 DIAGNOSIS — Z13.220 SCREENING CHOLESTEROL LEVEL: ICD-10-CM

## 2021-12-30 PROCEDURE — 99204 PR OFFICE/OUTPT VISIT, NEW, LEVL IV, 45-59 MIN: ICD-10-PCS | Mod: S$GLB,,, | Performed by: FAMILY MEDICINE

## 2021-12-30 PROCEDURE — 99204 OFFICE O/P NEW MOD 45 MIN: CPT | Mod: S$GLB,,, | Performed by: FAMILY MEDICINE

## 2021-12-30 RX ORDER — DEXTROAMPHETAMINE SACCHARATE, AMPHETAMINE ASPARTATE, DEXTROAMPHETAMINE SULFATE AND AMPHETAMINE SULFATE 5; 5; 5; 5 MG/1; MG/1; MG/1; MG/1
1 TABLET ORAL 2 TIMES DAILY
Qty: 60 TABLET | Refills: 0 | Status: SHIPPED | OUTPATIENT
Start: 2021-12-30 | End: 2022-02-10 | Stop reason: DRUGHIGH

## 2021-12-31 NOTE — PROGRESS NOTES
Here for new patient visit.  Seeing Mervat bullard nurse practitioner at [facility].  Used Adderall before.  Had foot surgery 30 years ago.  Disabled since then.  Off the Adderall since the foot surgery.  Difficulty doing housework and getting things done that she needs to do without it.  Unable to focus.  Was on 30 mg b.i.d..  Went to [facility] before in [place] and was treated with it.  Was on medication for probably 8 years or so.  Did poorly in school.  Did not graduate got GED.  ADD was untreated through [employer] high and high school..  Brother has ADD and her son has ADD.  6-year-old at home.  Difficulty keeping house.  Not working since the foot injury.    Social history former smoker.  No alcohol intake.  Disabled since foot injury.    Past medical history.  Fracture of the right foot.  Subsequent surgery which became infected.  On disability since then.  That is been about 3 years now.  Hypertension controlled.  Depression on Celexa for a while now.  No other surgeries.  Other than a BTL.   3 para 3 A/B 0.  Family history positive for hypertension and hyperlipidemia.    Review of systems in general feeling well.  Pulmonary no cough or sputum.  Cardiovascular chest pain palpitations.  GI no nausea vomiting diarrhea melena hematemesis.    Physical examination vital signs noted.  Pupils are equal round regular active light accommodation tympanic membranes without erythema.  Neck without bruit.  No adenopathy.  Chest clear.  Heart regular rate rhythm without gallop or rub.  Abdomen bowel sounds are positive soft nontender.  Extremities without edema positive pedal pulses.    Review of old EKG shows that he was normal normal sinus rhythm no acute changes.    Impression.  Hypertension controlled.  Depression controlled.  Attention deficit disorder.  History of right foot fracture.    Plan CBC CMP lipids TSH ordered.   check done.  Start Adderall at 20 mg b.i.d. 60 pills.  Follow-up in 1  month on this.  Discussed precautions to be used with schedule 2 medications.

## 2022-01-04 PROBLEM — I10 ESSENTIAL HYPERTENSION: Status: ACTIVE | Noted: 2022-01-04

## 2022-01-04 PROBLEM — L97.912 NON-PRESSURE CHRONIC ULCER OF RIGHT LOWER LEG WITH FAT LAYER EXPOSED: Status: RESOLVED | Noted: 2017-07-11 | Resolved: 2022-01-04

## 2022-01-04 PROBLEM — F98.8 ATTENTION DEFICIT DISORDER: Status: ACTIVE | Noted: 2022-01-04

## 2022-01-04 PROBLEM — K27.3 PEPTIC ULCER, ACUTE: Status: RESOLVED | Noted: 2017-06-03 | Resolved: 2022-01-04

## 2022-01-04 PROBLEM — T81.49XA POSTOPERATIVE WOUND INFECTION: Status: RESOLVED | Noted: 2017-05-30 | Resolved: 2022-01-04

## 2022-01-04 PROBLEM — S92.351A CLOSED DISPLACED FRACTURE OF FIFTH METATARSAL BONE OF RIGHT FOOT: Status: RESOLVED | Noted: 2017-04-25 | Resolved: 2022-01-04

## 2022-01-04 PROBLEM — T81.89XA DELAYED SURGICAL WOUND HEALING: Status: RESOLVED | Noted: 2017-07-11 | Resolved: 2022-01-04

## 2022-01-04 PROBLEM — F32.A DEPRESSION: Status: ACTIVE | Noted: 2022-01-04

## 2022-01-04 PROBLEM — D64.9 SEVERE ANEMIA: Status: RESOLVED | Noted: 2017-05-31 | Resolved: 2022-01-04

## 2022-01-14 RX ORDER — CITALOPRAM 20 MG/1
20 TABLET, FILM COATED ORAL DAILY
Qty: 30 TABLET | Refills: 5 | Status: SHIPPED | OUTPATIENT
Start: 2022-01-14 | End: 2022-05-10 | Stop reason: SDUPTHER

## 2022-01-14 NOTE — TELEPHONE ENCOUNTER
REQUEST SENT TO [facility]  ----- Message from Boom Mcguire sent at 1/14/2022  3:20 PM CST -----  Type:  RX Refill Request    Who Called:  PT  Refill or New Rx:  Refill  RX Name and Strength:  citalopram (CELEXA) 20 MG tablet  How is the patient currently taking it? (ex. 1XDay):  as directed  Is this a 30 day or 90 day RX:  30  Preferred Pharmacy with phone number:      [id number] - [facility] [address] [address] AT [facility] [address]  [address] [place] [id number]  Phone: [phone] Fax: [phone]      Local or Mail Order:  local    Best Call Back Number:  [phone]   Additional Information:  Please advise -- Thank you

## 2022-02-04 LAB
ALBUMIN SERPL-MCNC: 3.9 G/DL (ref 3.6–5.1)
ALBUMIN/GLOB SERPL: 1.3 (CALC) (ref 1–2.5)
ALP SERPL-CCNC: 56 U/L (ref 31–125)
ALT SERPL-CCNC: 10 U/L (ref 6–29)
AST SERPL-CCNC: 12 U/L (ref 10–30)
BASOPHILS # BLD AUTO: 28 CELLS/UL (ref 0–200)
BASOPHILS NFR BLD AUTO: 0.4 %
BILIRUB SERPL-MCNC: 0.4 MG/DL (ref 0.2–1.2)
BUN SERPL-MCNC: 11 MG/DL (ref 7–25)
BUN/CREAT SERPL: NORMAL (CALC) (ref 6–22)
CALCIUM SERPL-MCNC: 9.4 MG/DL (ref 8.6–10.2)
CHLORIDE SERPL-SCNC: 104 MMOL/L (ref 98–110)
CHOLEST SERPL-MCNC: 182 MG/DL
CHOLEST/HDLC SERPL: 3.2 (CALC)
CO2 SERPL-SCNC: 28 MMOL/L (ref 20–32)
CREAT SERPL-MCNC: 0.8 MG/DL (ref 0.5–1.1)
EOSINOPHIL # BLD AUTO: 128 CELLS/UL (ref 15–500)
EOSINOPHIL NFR BLD AUTO: 1.8 %
ERYTHROCYTE [DISTWIDTH] IN BLOOD BY AUTOMATED COUNT: 14.9 % (ref 11–15)
GLOBULIN SER CALC-MCNC: 3.1 G/DL (CALC) (ref 1.9–3.7)
GLUCOSE SERPL-MCNC: 86 MG/DL (ref 65–99)
HCT VFR BLD AUTO: 39.8 % (ref 35–45)
HDLC SERPL-MCNC: 57 MG/DL
HGB BLD-MCNC: 12.7 G/DL (ref 11.7–15.5)
LDLC SERPL CALC-MCNC: 100 MG/DL (CALC)
LYMPHOCYTES # BLD AUTO: 1285 CELLS/UL (ref 850–3900)
LYMPHOCYTES NFR BLD AUTO: 18.1 %
MCH RBC QN AUTO: 27.9 PG (ref 27–33)
MCHC RBC AUTO-ENTMCNC: 31.9 G/DL (ref 32–36)
MCV RBC AUTO: 87.5 FL (ref 80–100)
MONOCYTES # BLD AUTO: 575 CELLS/UL (ref 200–950)
MONOCYTES NFR BLD AUTO: 8.1 %
NEUTROPHILS # BLD AUTO: 5084 CELLS/UL (ref 1500–7800)
NEUTROPHILS NFR BLD AUTO: 71.6 %
NONHDLC SERPL-MCNC: 125 MG/DL (CALC)
PLATELET # BLD AUTO: 339 THOUSAND/UL (ref 140–400)
PMV BLD REES-ECKER: 11.6 FL (ref 7.5–12.5)
POTASSIUM SERPL-SCNC: 4.6 MMOL/L (ref 3.5–5.3)
PROT SERPL-MCNC: 7 G/DL (ref 6.1–8.1)
RBC # BLD AUTO: 4.55 MILLION/UL (ref 3.8–5.1)
SODIUM SERPL-SCNC: 138 MMOL/L (ref 135–146)
TRIGL SERPL-MCNC: 157 MG/DL
TSH SERPL-ACNC: 1.29 MIU/L
WBC # BLD AUTO: 7.1 THOUSAND/UL (ref 3.8–10.8)

## 2022-02-10 ENCOUNTER — OFFICE VISIT (OUTPATIENT)
Dept: FAMILY MEDICINE | Facility: CLINIC | Age: 42
End: 2022-02-10
Payer: MEDICARE

## 2022-02-10 VITALS
HEIGHT: 66 IN | HEART RATE: 90 BPM | TEMPERATURE: 98 F | SYSTOLIC BLOOD PRESSURE: 132 MMHG | BODY MASS INDEX: 29.89 KG/M2 | OXYGEN SATURATION: 100 % | WEIGHT: 186 LBS | DIASTOLIC BLOOD PRESSURE: 80 MMHG

## 2022-02-10 DIAGNOSIS — F98.8 ATTENTION DEFICIT DISORDER, UNSPECIFIED HYPERACTIVITY PRESENCE: ICD-10-CM

## 2022-02-10 DIAGNOSIS — I10 ESSENTIAL HYPERTENSION: Primary | ICD-10-CM

## 2022-02-10 DIAGNOSIS — F32.A DEPRESSION, UNSPECIFIED DEPRESSION TYPE: ICD-10-CM

## 2022-02-10 PROCEDURE — 99214 PR OFFICE/OUTPT VISIT, EST, LEVL IV, 30-39 MIN: ICD-10-PCS | Mod: S$GLB,,, | Performed by: FAMILY MEDICINE

## 2022-02-10 PROCEDURE — 99214 OFFICE O/P EST MOD 30 MIN: CPT | Mod: S$GLB,,, | Performed by: FAMILY MEDICINE

## 2022-02-10 RX ORDER — DEXTROAMPHETAMINE SACCHARATE, AMPHETAMINE ASPARTATE, DEXTROAMPHETAMINE SULFATE AND AMPHETAMINE SULFATE 7.5; 7.5; 7.5; 7.5 MG/1; MG/1; MG/1; MG/1
30 TABLET ORAL 2 TIMES DAILY
Qty: 60 TABLET | Refills: 0 | Status: SHIPPED | OUTPATIENT
Start: 2022-03-10 | End: 2022-05-10 | Stop reason: SDUPTHER

## 2022-02-10 RX ORDER — DEXTROAMPHETAMINE SACCHARATE, AMPHETAMINE ASPARTATE, DEXTROAMPHETAMINE SULFATE AND AMPHETAMINE SULFATE 7.5; 7.5; 7.5; 7.5 MG/1; MG/1; MG/1; MG/1
30 TABLET ORAL 2 TIMES DAILY
Qty: 60 TABLET | Refills: 0 | Status: SHIPPED | OUTPATIENT
Start: 2022-02-10 | End: 2022-05-10 | Stop reason: SDUPTHER

## 2022-02-10 RX ORDER — DEXTROAMPHETAMINE SACCHARATE, AMPHETAMINE ASPARTATE, DEXTROAMPHETAMINE SULFATE AND AMPHETAMINE SULFATE 7.5; 7.5; 7.5; 7.5 MG/1; MG/1; MG/1; MG/1
30 TABLET ORAL 2 TIMES DAILY
Qty: 60 TABLET | Refills: 0 | Status: SHIPPED | OUTPATIENT
Start: 2022-04-10 | End: 2022-05-10 | Stop reason: SDUPTHER

## 2022-02-12 NOTE — PROGRESS NOTES
Follow-up of hypertension.  Doing well.  Depression doing all right.  ADD.  Did okay on the medication.  Needs refill.  No cardiovascular issues with that no palpitations or chest pain.  She would like to increase the dose from 20 mg to 30 mg however.  Did not seem to have full affect.  Has not taken COVID vaccine.  TSH 1.29.  Cholesterol 182 HDL 57  CBC is normal CMP is normal.   check done.    Physical examination vital signs noted.  Neck without bruit.  Chest clear.  Heart regular rate rhythm.  Abdomen bowel sounds positive soft nontender.  Extremities are without edema positive pulses.    Impression.  Hypertension controlled.  Depression doing well.  ADD mostly controlled on current dose.    Plan  check done.  Adderall increased to 30 mg b.i.d. 3 prescriptions for 60 each.  Follow-up in 3 months.  Pap smear done in 2021 by Dr. Reynaldo betancur.  Tetanus diptheria shot done 3 years ago for foot surgery.  Has not taken COVID vaccine does not want to take it and has not taken flu vaccine and does not want it either.

## 2022-03-03 ENCOUNTER — PATIENT OUTREACH (OUTPATIENT)
Dept: ADMINISTRATIVE | Facility: HOSPITAL | Age: 42
End: 2022-03-03
Payer: MEDICARE

## 2022-03-03 NOTE — PROGRESS NOTES
Chart review completed 2022.  Care Everywhere updates requested and reviewed.  Immunizations reconciled. Media reports reviewed.  Duplicate HM overrides and  orders removed.  Overdue HM topic chart audit and/or requested.  Overdue lab testing linked to upcoming lab appointments if applies.    Lab sha, and quest reviewed, HM external PAP smear, Hep C ad HIV updated.      Health Maintenance Due   Topic Date Due    COVID-19 Vaccine (1) Never done    TETANUS VACCINE  Never done    Influenza Vaccine (1) Never done

## 2022-03-28 RX ORDER — TRANDOLAPRIL 2 MG/1
2 TABLET ORAL DAILY
Qty: 90 TABLET | Refills: 1 | Status: SHIPPED | OUTPATIENT
Start: 2022-03-28 | End: 2022-05-10 | Stop reason: SDUPTHER

## 2022-03-28 NOTE — TELEPHONE ENCOUNTER
No new care gaps identified.  Powered by [employer]. Reference number: [id number].   3/28/2022 5:29:04 PM CDT

## 2022-03-28 NOTE — TELEPHONE ENCOUNTER
----- Message from Jaquan Clemente sent at 3/28/2022  2:18 PM CDT -----  Contact: pt  Needing a refill on Rx trandolapriL (MAVIK) 2 MG Tab, pt is say 1mg seeing it listed as this on pt chart request is below,      Type: Rx Refill Request    Who Called:pt  Refill or New Rx:Refill  Rx Name and Strength:    trandolapriL (MAVIK) 2 MG Tab    How is the patient currently taking it?(ex.1xday): As Directed  Is this a 30 day or 90 day Rx: As Directed  Preferred Pharmacy with Phone Number:    [id number] - [place], [place] - [address]  [address]  [facility] [address]  Phone: [phone] Fax: [phone]

## 2022-04-26 ENCOUNTER — PATIENT OUTREACH (OUTPATIENT)
Dept: ADMINISTRATIVE | Facility: HOSPITAL | Age: 42
End: 2022-04-26
Payer: MEDICARE

## 2022-05-10 ENCOUNTER — OFFICE VISIT (OUTPATIENT)
Dept: FAMILY MEDICINE | Facility: CLINIC | Age: 42
End: 2022-05-10
Payer: MEDICARE

## 2022-05-10 VITALS
HEIGHT: 66 IN | WEIGHT: 183 LBS | OXYGEN SATURATION: 99 % | DIASTOLIC BLOOD PRESSURE: 86 MMHG | BODY MASS INDEX: 29.41 KG/M2 | HEART RATE: 80 BPM | TEMPERATURE: 98 F | SYSTOLIC BLOOD PRESSURE: 128 MMHG

## 2022-05-10 DIAGNOSIS — B35.4 TINEA CORPORIS: ICD-10-CM

## 2022-05-10 DIAGNOSIS — F98.8 ATTENTION DEFICIT DISORDER, UNSPECIFIED HYPERACTIVITY PRESENCE: ICD-10-CM

## 2022-05-10 DIAGNOSIS — I10 ESSENTIAL HYPERTENSION: Primary | ICD-10-CM

## 2022-05-10 DIAGNOSIS — F32.A DEPRESSION, UNSPECIFIED DEPRESSION TYPE: ICD-10-CM

## 2022-05-10 DIAGNOSIS — M21.619 BUNION: ICD-10-CM

## 2022-05-10 PROCEDURE — 1159F MED LIST DOCD IN RCRD: CPT | Mod: CPTII,S$GLB,, | Performed by: FAMILY MEDICINE

## 2022-05-10 PROCEDURE — 1160F RVW MEDS BY RX/DR IN RCRD: CPT | Mod: CPTII,S$GLB,, | Performed by: FAMILY MEDICINE

## 2022-05-10 PROCEDURE — 99214 PR OFFICE/OUTPT VISIT, EST, LEVL IV, 30-39 MIN: ICD-10-PCS | Mod: S$GLB,,, | Performed by: FAMILY MEDICINE

## 2022-05-10 PROCEDURE — 3079F DIAST BP 80-89 MM HG: CPT | Mod: CPTII,S$GLB,, | Performed by: FAMILY MEDICINE

## 2022-05-10 PROCEDURE — 3074F PR MOST RECENT SYSTOLIC BLOOD PRESSURE < 130 MM HG: ICD-10-PCS | Mod: CPTII,S$GLB,, | Performed by: FAMILY MEDICINE

## 2022-05-10 PROCEDURE — 1160F PR REVIEW ALL MEDS BY PRESCRIBER/CLIN PHARMACIST DOCUMENTED: ICD-10-PCS | Mod: CPTII,S$GLB,, | Performed by: FAMILY MEDICINE

## 2022-05-10 PROCEDURE — 4010F PR ACE/ARB THEARPY RXD/TAKEN: ICD-10-PCS | Mod: CPTII,S$GLB,, | Performed by: FAMILY MEDICINE

## 2022-05-10 PROCEDURE — 3008F BODY MASS INDEX DOCD: CPT | Mod: CPTII,S$GLB,, | Performed by: FAMILY MEDICINE

## 2022-05-10 PROCEDURE — 4010F ACE/ARB THERAPY RXD/TAKEN: CPT | Mod: CPTII,S$GLB,, | Performed by: FAMILY MEDICINE

## 2022-05-10 PROCEDURE — 3074F SYST BP LT 130 MM HG: CPT | Mod: CPTII,S$GLB,, | Performed by: FAMILY MEDICINE

## 2022-05-10 PROCEDURE — 3079F PR MOST RECENT DIASTOLIC BLOOD PRESSURE 80-89 MM HG: ICD-10-PCS | Mod: CPTII,S$GLB,, | Performed by: FAMILY MEDICINE

## 2022-05-10 PROCEDURE — 99214 OFFICE O/P EST MOD 30 MIN: CPT | Mod: S$GLB,,, | Performed by: FAMILY MEDICINE

## 2022-05-10 PROCEDURE — 3008F PR BODY MASS INDEX (BMI) DOCUMENTED: ICD-10-PCS | Mod: CPTII,S$GLB,, | Performed by: FAMILY MEDICINE

## 2022-05-10 PROCEDURE — 1159F PR MEDICATION LIST DOCUMENTED IN MEDICAL RECORD: ICD-10-PCS | Mod: CPTII,S$GLB,, | Performed by: FAMILY MEDICINE

## 2022-05-10 RX ORDER — CLOTRIMAZOLE AND BETAMETHASONE DIPROPIONATE 10; .64 MG/G; MG/G
CREAM TOPICAL 2 TIMES DAILY
Qty: 60 G | Refills: 1 | Status: SHIPPED | OUTPATIENT
Start: 2022-05-10 | End: 2022-05-24

## 2022-05-10 RX ORDER — TRANDOLAPRIL 2 MG/1
2 TABLET ORAL DAILY
Qty: 90 TABLET | Refills: 1 | Status: SHIPPED | OUTPATIENT
Start: 2022-05-10 | End: 2022-09-01 | Stop reason: SDUPTHER

## 2022-05-10 RX ORDER — DEXTROAMPHETAMINE SACCHARATE, AMPHETAMINE ASPARTATE, DEXTROAMPHETAMINE SULFATE AND AMPHETAMINE SULFATE 7.5; 7.5; 7.5; 7.5 MG/1; MG/1; MG/1; MG/1
30 TABLET ORAL 2 TIMES DAILY
Qty: 60 TABLET | Refills: 0 | Status: SHIPPED | OUTPATIENT
Start: 2022-07-10 | End: 2022-09-01 | Stop reason: SDUPTHER

## 2022-05-10 RX ORDER — DEXTROAMPHETAMINE SACCHARATE, AMPHETAMINE ASPARTATE, DEXTROAMPHETAMINE SULFATE AND AMPHETAMINE SULFATE 7.5; 7.5; 7.5; 7.5 MG/1; MG/1; MG/1; MG/1
30 TABLET ORAL 2 TIMES DAILY
Qty: 60 TABLET | Refills: 0 | Status: SHIPPED | OUTPATIENT
Start: 2022-05-10 | End: 2022-10-31 | Stop reason: SDUPTHER

## 2022-05-10 RX ORDER — CITALOPRAM 20 MG/1
20 TABLET, FILM COATED ORAL DAILY
Qty: 90 TABLET | Refills: 1 | Status: SHIPPED | OUTPATIENT
Start: 2022-05-10 | End: 2022-09-01 | Stop reason: SDUPTHER

## 2022-05-10 RX ORDER — DEXTROAMPHETAMINE SACCHARATE, AMPHETAMINE ASPARTATE, DEXTROAMPHETAMINE SULFATE AND AMPHETAMINE SULFATE 7.5; 7.5; 7.5; 7.5 MG/1; MG/1; MG/1; MG/1
30 TABLET ORAL 2 TIMES DAILY
Qty: 60 TABLET | Refills: 0 | Status: SHIPPED | OUTPATIENT
Start: 2022-06-10 | End: 2022-09-28 | Stop reason: SDUPTHER

## 2022-05-14 NOTE — PROGRESS NOTES
Subjective:       Patient ID: Priscilla Wall is a 41 y.o. female.    Chief Complaint: Follow-up (3 month) and Rash (Under left armpit)    Has a left axillary rash she would like checked.  Burn some.  Attention deficit disorder.  Doing well on her medication.  No palpitations no chest pain.  Needs refill of the medicine.  Adderall 30 mg b.i.d..  BMI is 29.6.  Decreased slightly.  Working and walking some.  Down 12 lb.  TSH and lipids are normal.  Depression doing okay on the Celexa needs refill this.  Also bunion.  Needs Podiatry.  Prior surgery on right foot.    Physical examination vital signs noted.  Neck without bruit.  Chest clear.  Heart regular rate and rhythm.  Abdomen bowel sounds positive soft nontender.  Left axilla erythematosus rash.  Oval reddened area.  Looks like tinea.    Review of Systems    Objective:      Physical Exam    Assessment:       1. Essential hypertension    2. Attention deficit disorder, unspecified hyperactivity presence    3. Depression, unspecified depression type    4. BMI 29.0-29.9,adult    5. Bunion    6. Tinea corporis        Plan:       Essential hypertension    Attention deficit disorder, unspecified hyperactivity presence    Depression, unspecified depression type    BMI 29.0-29.9,adult    Bunion  -     Ambulatory referral/consult to Podiatry; Future; Expected date: 05/17/2022    Tinea corporis    Other orders  -     dextroamphetamine-amphetamine 30 mg Tab; Take 1 tablet (30 mg total) by mouth 2 (two) times a day.  Dispense: 60 tablet; Refill: 0  -     dextroamphetamine-amphetamine 30 mg Tab; Take 1 tablet (30 mg total) by mouth 2 (two) times a day.  Dispense: 60 tablet; Refill: 0  -     dextroamphetamine-amphetamine 30 mg Tab; Take 1 tablet (30 mg total) by mouth 2 (two) times a day.  Dispense: 60 tablet; Refill: 0  -     citalopram (CELEXA) 20 MG tablet; Take 1 tablet (20 mg total) by mouth once daily.  Dispense: 90 tablet; Refill: 1  -     trandolapriL (MAVIK) 2 MG Tab;  Take 1 tablet (2 mg total) by mouth once daily.  Dispense: 90 tablet; Refill: 1  -     clotrimazole-betamethasone 1-0.05% (LOTRISONE) cream; Apply topically 2 (two) times daily. for 14 days  Dispense: 60 g; Refill: 1      Refill her ADD medications.  Tetanus diptheria shot documentation received it 3 or 4 years ago.  Referred her to Podiatry regarding her feet.  Refill her Adderall 30 mg b.i.d. 3 prescriptions for 60 each.  Continue her Celexa.  Routine follow-up blood pressure ADD 3 months.  Continue diet weight reduction.

## 2022-08-17 ENCOUNTER — TELEPHONE (OUTPATIENT)
Dept: FAMILY MEDICINE | Facility: CLINIC | Age: 42
End: 2022-08-17
Payer: MEDICARE

## 2022-09-01 ENCOUNTER — OFFICE VISIT (OUTPATIENT)
Dept: FAMILY MEDICINE | Facility: CLINIC | Age: 42
End: 2022-09-01
Payer: MEDICARE

## 2022-09-01 VITALS
OXYGEN SATURATION: 99 % | WEIGHT: 188.38 LBS | DIASTOLIC BLOOD PRESSURE: 86 MMHG | HEART RATE: 83 BPM | SYSTOLIC BLOOD PRESSURE: 132 MMHG | BODY MASS INDEX: 30.28 KG/M2 | HEIGHT: 66 IN | TEMPERATURE: 98 F

## 2022-09-01 DIAGNOSIS — I10 ESSENTIAL HYPERTENSION: Primary | ICD-10-CM

## 2022-09-01 DIAGNOSIS — F32.A DEPRESSION, UNSPECIFIED DEPRESSION TYPE: ICD-10-CM

## 2022-09-01 DIAGNOSIS — M79.89 SWELLING OF RIGHT FOOT: ICD-10-CM

## 2022-09-01 DIAGNOSIS — F98.8 ATTENTION DEFICIT DISORDER, UNSPECIFIED HYPERACTIVITY PRESENCE: ICD-10-CM

## 2022-09-01 PROCEDURE — 3075F PR MOST RECENT SYSTOLIC BLOOD PRESS GE 130-139MM HG: ICD-10-PCS | Mod: CPTII,S$GLB,, | Performed by: FAMILY MEDICINE

## 2022-09-01 PROCEDURE — 1160F RVW MEDS BY RX/DR IN RCRD: CPT | Mod: CPTII,S$GLB,, | Performed by: FAMILY MEDICINE

## 2022-09-01 PROCEDURE — 3079F DIAST BP 80-89 MM HG: CPT | Mod: CPTII,S$GLB,, | Performed by: FAMILY MEDICINE

## 2022-09-01 PROCEDURE — 99214 OFFICE O/P EST MOD 30 MIN: CPT | Mod: S$GLB,,, | Performed by: FAMILY MEDICINE

## 2022-09-01 PROCEDURE — 99214 PR OFFICE/OUTPT VISIT, EST, LEVL IV, 30-39 MIN: ICD-10-PCS | Mod: S$GLB,,, | Performed by: FAMILY MEDICINE

## 2022-09-01 PROCEDURE — 1159F PR MEDICATION LIST DOCUMENTED IN MEDICAL RECORD: ICD-10-PCS | Mod: CPTII,S$GLB,, | Performed by: FAMILY MEDICINE

## 2022-09-01 PROCEDURE — 3008F BODY MASS INDEX DOCD: CPT | Mod: CPTII,S$GLB,, | Performed by: FAMILY MEDICINE

## 2022-09-01 PROCEDURE — 1159F MED LIST DOCD IN RCRD: CPT | Mod: CPTII,S$GLB,, | Performed by: FAMILY MEDICINE

## 2022-09-01 PROCEDURE — 1160F PR REVIEW ALL MEDS BY PRESCRIBER/CLIN PHARMACIST DOCUMENTED: ICD-10-PCS | Mod: CPTII,S$GLB,, | Performed by: FAMILY MEDICINE

## 2022-09-01 PROCEDURE — 3079F PR MOST RECENT DIASTOLIC BLOOD PRESSURE 80-89 MM HG: ICD-10-PCS | Mod: CPTII,S$GLB,, | Performed by: FAMILY MEDICINE

## 2022-09-01 PROCEDURE — 3075F SYST BP GE 130 - 139MM HG: CPT | Mod: CPTII,S$GLB,, | Performed by: FAMILY MEDICINE

## 2022-09-01 PROCEDURE — 4010F ACE/ARB THERAPY RXD/TAKEN: CPT | Mod: CPTII,S$GLB,, | Performed by: FAMILY MEDICINE

## 2022-09-01 PROCEDURE — 4010F PR ACE/ARB THEARPY RXD/TAKEN: ICD-10-PCS | Mod: CPTII,S$GLB,, | Performed by: FAMILY MEDICINE

## 2022-09-01 PROCEDURE — 3008F PR BODY MASS INDEX (BMI) DOCUMENTED: ICD-10-PCS | Mod: CPTII,S$GLB,, | Performed by: FAMILY MEDICINE

## 2022-09-01 RX ORDER — DEXTROAMPHETAMINE SACCHARATE, AMPHETAMINE ASPARTATE, DEXTROAMPHETAMINE SULFATE AND AMPHETAMINE SULFATE 7.5; 7.5; 7.5; 7.5 MG/1; MG/1; MG/1; MG/1
30 TABLET ORAL 2 TIMES DAILY
Qty: 60 TABLET | Refills: 0 | Status: SHIPPED | OUTPATIENT
Start: 2022-09-30 | End: 2022-12-01 | Stop reason: SDUPTHER

## 2022-09-01 RX ORDER — DEXTROAMPHETAMINE SACCHARATE, AMPHETAMINE ASPARTATE, DEXTROAMPHETAMINE SULFATE AND AMPHETAMINE SULFATE 7.5; 7.5; 7.5; 7.5 MG/1; MG/1; MG/1; MG/1
30 TABLET ORAL 2 TIMES DAILY
Qty: 60 TABLET | Refills: 0 | Status: SHIPPED | OUTPATIENT
Start: 2022-11-01 | End: 2023-03-06 | Stop reason: SDUPTHER

## 2022-09-01 RX ORDER — VALACYCLOVIR HYDROCHLORIDE 1 G/1
TABLET, FILM COATED ORAL
COMMUNITY
Start: 2022-07-25 | End: 2023-03-06 | Stop reason: SDUPTHER

## 2022-09-01 RX ORDER — CITALOPRAM 20 MG/1
40 TABLET, FILM COATED ORAL DAILY
Qty: 90 TABLET | Refills: 1 | Status: SHIPPED | OUTPATIENT
Start: 2022-09-01 | End: 2022-10-31 | Stop reason: SDUPTHER

## 2022-09-01 RX ORDER — MUPIROCIN 20 MG/G
OINTMENT TOPICAL
COMMUNITY
Start: 2022-07-25 | End: 2023-03-06 | Stop reason: SDUPTHER

## 2022-09-01 RX ORDER — DEXTROAMPHETAMINE SACCHARATE, AMPHETAMINE ASPARTATE, DEXTROAMPHETAMINE SULFATE AND AMPHETAMINE SULFATE 7.5; 7.5; 7.5; 7.5 MG/1; MG/1; MG/1; MG/1
30 TABLET ORAL 2 TIMES DAILY
Qty: 60 TABLET | Refills: 0 | Status: SHIPPED | OUTPATIENT
Start: 2022-09-01 | End: 2022-12-01 | Stop reason: SDUPTHER

## 2022-09-01 RX ORDER — TRANDOLAPRIL 2 MG/1
2 TABLET ORAL DAILY
Qty: 90 TABLET | Refills: 1 | Status: SHIPPED | OUTPATIENT
Start: 2022-09-01 | End: 2023-02-23

## 2022-09-04 NOTE — PROGRESS NOTES
Subjective:       Patient ID: Priscilla Wall is a 41 y.o. female.    Chief Complaint: Medication Refill    Follow-up of attention deficit disorder.  Needs refill of medications.  Doing well for her.  Adderall 30 b.i.d..  Cardiovascular no chest pain no palpitations no PND orthopnea.  Hypertension is controlled.  Needs refill of medication.  Mavik 2 mg daily.  Depression on Celexa.  Depressed on the 20 mg.  Would like to increase dose.  Also right foot surgery 2 surgeries.  Swells.  Can not wear sandals due to the discomfort.  Frequent pain.  Hand works as a  2 days per week.  Labs CBC CMP and lipids were normal in February.      Physical examination vital signs noted.  No acute distress.  Neck without bruit no adenopathy.  Chest clear.  Heart regular rate rhythm.  Abdomen soft nontender.  Extremities without significant edema.  The right foot does have slight swelling.      Objective:        Assessment:       1. Essential hypertension    2. Depression, unspecified depression type    3. Attention deficit disorder, unspecified hyperactivity presence    4. Swelling of right foot          Plan:       Essential hypertension    Depression, unspecified depression type    Attention deficit disorder, unspecified hyperactivity presence    Swelling of right foot  -     Ambulatory referral/consult to Podiatry; Future; Expected date: 09/08/2022    Other orders  -     trandolapriL (MAVIK) 2 MG Tab; Take 1 tablet (2 mg total) by mouth once daily.  Dispense: 90 tablet; Refill: 1  -     citalopram (CELEXA) 20 MG tablet; Take 2 tablets (40 mg total) by mouth once daily.  Dispense: 90 tablet; Refill: 1  -     dextroamphetamine-amphetamine 30 mg Tab; Take 1 tablet (30 mg total) by mouth 2 (two) times a day.  Dispense: 60 tablet; Refill: 0  -     dextroamphetamine-amphetamine 30 mg Tab; Take 1 tablet (30 mg total) by mouth 2 (two) times a day.  Dispense: 60 tablet; Refill: 0  -     dextroamphetamine-amphetamine 30 mg Tab;  Take 1 tablet (30 mg total) by mouth 2 (two) times a day.  Dispense: 60 tablet; Refill: 0      Increase the Celexa if she desires.  Refill the Adderall.  Refill the Mavik.  Refer her to Dr. Wade castellano regarding the foot.  Follow-up in a month.  Declines flu vaccine.

## 2022-09-28 NOTE — TELEPHONE ENCOUNTER
Called and cancelled it at [facility] sharp advised     ----- Message from Wei Carroll sent at 9/28/2022 11:18 AM CDT -----  Regarding: tx  Contact: Patient  Patient want to know if office can transfer Adderall from [facility] to [facility], any questions please call back at [phone] (home)       [facility] #[id number] - [place], [place] - [address]  [address] [place] [address]  Phone: [phone] Fax: [phone]      [facility]pharmacy #[id number] - [place], [place] - [address]  [address] [facility] [facility]  [facility] [address]  Phone: [phone] Fax: [phone]

## 2022-09-29 RX ORDER — DEXTROAMPHETAMINE SACCHARATE, AMPHETAMINE ASPARTATE, DEXTROAMPHETAMINE SULFATE AND AMPHETAMINE SULFATE 7.5; 7.5; 7.5; 7.5 MG/1; MG/1; MG/1; MG/1
30 TABLET ORAL 2 TIMES DAILY
Qty: 60 TABLET | Refills: 0 | Status: SHIPPED | OUTPATIENT
Start: 2022-09-29 | End: 2022-12-07 | Stop reason: SDUPTHER

## 2022-10-26 ENCOUNTER — TELEPHONE (OUTPATIENT)
Dept: FAMILY MEDICINE | Facility: CLINIC | Age: 42
End: 2022-10-26
Payer: MEDICARE

## 2022-10-26 NOTE — TELEPHONE ENCOUNTER
----- Message from Cassia Mcknight sent at 10/26/2022 11:07 AM CDT -----  Type:  RX Refill Request  Who Called: Pt   Refill or New Rx: refill  RX Name and Strength: citalopram (CELEXA) 20 MG tablet 90 tablet , dextroamphetamine-amphetamine 30 mg Tab 60 tablet   How is the patient currently taking it? (ex. 1XDay):    Is this a 30 day or 90 day RX:    Preferred Pharmacy with phone number:  [id number] [address] [facility] [address]   Phone:  [phone]  Fax:  [phone]  Local or Mail Order:  Local  Ordering Provider:  Tom North III, MD  Best Call Back Number:  [phone]  Additional Information: Pt requesting refills on Rx citalopram (CELEXA) 20 MG tablet 90 tablet , dextroamphetamine-amphetamine 30 mg Tab 60 tablet

## 2022-10-27 ENCOUNTER — TELEPHONE (OUTPATIENT)
Dept: FAMILY MEDICINE | Facility: CLINIC | Age: 42
End: 2022-10-27
Payer: MEDICARE

## 2022-10-27 NOTE — TELEPHONE ENCOUNTER
----- Message from Cassia Mcknight sent at 10/27/2022  9:19 AM CDT -----  Type: Needs Medical Advice  Who Called: Pt   Symptoms (please be specific):   How long has patient had these symptoms:    Pharmacy name and phone #:    Best Call Back Number: [phone]  Additional Information: Pt requesting a call back concerning her refill request from yesterday, pt states she has been without her medications for a few days.

## 2022-10-28 ENCOUNTER — TELEPHONE (OUTPATIENT)
Dept: FAMILY MEDICINE | Facility: CLINIC | Age: 42
End: 2022-10-28
Payer: MEDICARE

## 2022-10-28 NOTE — TELEPHONE ENCOUNTER
----- Message from Wei Carroll sent at 10/28/2022 10:26 AM CDT -----  Regarding: refill  Contact: Patient  Type:  RX Refill Request    Who Called:  Patient  Refill or New Rx:  Refill  RX Name and Strength:  CELEXA) 20 MG and dextroamphetamine-amphetamine 30 mg   How is the patient currently taking it? (ex. 1XDay):  one time a day  Is this a 30 day or 90 day RX:  30day  Preferred Pharmacy with phone number:      [facility]/pharmacy #[id number] - [place], [place] - [address] [address]  [address] [address]  Phone: [phone] Fax: [phone]     Local or Mail Order:  Local  Ordering Provider:  Dr Mary Anne Potts Call Back Number:  [phone] (home)     Case number [id number]

## 2022-10-31 RX ORDER — DEXTROAMPHETAMINE SACCHARATE, AMPHETAMINE ASPARTATE, DEXTROAMPHETAMINE SULFATE AND AMPHETAMINE SULFATE 7.5; 7.5; 7.5; 7.5 MG/1; MG/1; MG/1; MG/1
30 TABLET ORAL 2 TIMES DAILY
Qty: 60 TABLET | Refills: 0 | Status: SHIPPED | OUTPATIENT
Start: 2022-10-31 | End: 2022-12-01 | Stop reason: SDUPTHER

## 2022-10-31 RX ORDER — CITALOPRAM 20 MG/1
40 TABLET, FILM COATED ORAL DAILY
Qty: 180 TABLET | Refills: 1 | Status: SHIPPED | OUTPATIENT
Start: 2022-10-31 | End: 2023-03-06 | Stop reason: SDUPTHER

## 2022-10-31 NOTE — TELEPHONE ENCOUNTER
----- Message from Wei Carroll sent at 10/31/2022  2:35 PM CDT -----  Regarding: tx rx  Contact: patient  Patient want to transfer dextroamphetamine-amphetamine 30 mg  from [facility] pharmacy to [facility], any questions please call back at [phone] (home)       [facility] #[id number] - [place] - [address]  [address]  [facility] [address]  Phone: [phone] Fax: [phone]    [facility]/pharmacy #[id number] - [place] - [address]  [address]  [facility] [address]  Phone: [phone] Fax: [phone]    Case number [id number]

## 2022-10-31 NOTE — TELEPHONE ENCOUNTER
----- Message from Cassia Mcknight sent at 10/31/2022  8:23 AM CDT -----  Contact: Mervat  Type:  Pharmacy Calling to Clarify an RX  Name of Caller:  Mervat  Pharmacy Name:  [facility] #[id number] [place] [address] [address]   Phone:  [phone]  Fax:  [phone]  Prescription Name:  citalopram (CELEXA) 20 MG tablet 90 tablet   What do they need to clarify?: dosage need clarification  Best Call Back Number:  [phone]  Additional Information:  Mervat requesting a call back to get clarification on the dosage for Rx citalopram (CELEXA) 20 MG tablet 90 tablet   .

## 2022-12-01 ENCOUNTER — OFFICE VISIT (OUTPATIENT)
Dept: FAMILY MEDICINE | Facility: CLINIC | Age: 42
End: 2022-12-01
Payer: MEDICARE

## 2022-12-01 VITALS
TEMPERATURE: 98 F | BODY MASS INDEX: 29.89 KG/M2 | OXYGEN SATURATION: 100 % | HEART RATE: 65 BPM | HEIGHT: 66 IN | DIASTOLIC BLOOD PRESSURE: 86 MMHG | WEIGHT: 186 LBS | SYSTOLIC BLOOD PRESSURE: 132 MMHG

## 2022-12-01 DIAGNOSIS — I10 ESSENTIAL HYPERTENSION: ICD-10-CM

## 2022-12-01 DIAGNOSIS — M25.511 RIGHT SHOULDER PAIN, UNSPECIFIED CHRONICITY: ICD-10-CM

## 2022-12-01 DIAGNOSIS — F98.8 ATTENTION DEFICIT DISORDER, UNSPECIFIED HYPERACTIVITY PRESENCE: Primary | ICD-10-CM

## 2022-12-01 PROCEDURE — 3008F PR BODY MASS INDEX (BMI) DOCUMENTED: ICD-10-PCS | Mod: CPTII,S$GLB,, | Performed by: FAMILY MEDICINE

## 2022-12-01 PROCEDURE — 3079F DIAST BP 80-89 MM HG: CPT | Mod: CPTII,S$GLB,, | Performed by: FAMILY MEDICINE

## 2022-12-01 PROCEDURE — 1159F MED LIST DOCD IN RCRD: CPT | Mod: CPTII,S$GLB,, | Performed by: FAMILY MEDICINE

## 2022-12-01 PROCEDURE — 99214 OFFICE O/P EST MOD 30 MIN: CPT | Mod: S$GLB,,, | Performed by: FAMILY MEDICINE

## 2022-12-01 PROCEDURE — 4010F PR ACE/ARB THEARPY RXD/TAKEN: ICD-10-PCS | Mod: CPTII,S$GLB,, | Performed by: FAMILY MEDICINE

## 2022-12-01 PROCEDURE — 1160F RVW MEDS BY RX/DR IN RCRD: CPT | Mod: CPTII,S$GLB,, | Performed by: FAMILY MEDICINE

## 2022-12-01 PROCEDURE — 3079F PR MOST RECENT DIASTOLIC BLOOD PRESSURE 80-89 MM HG: ICD-10-PCS | Mod: CPTII,S$GLB,, | Performed by: FAMILY MEDICINE

## 2022-12-01 PROCEDURE — 3008F BODY MASS INDEX DOCD: CPT | Mod: CPTII,S$GLB,, | Performed by: FAMILY MEDICINE

## 2022-12-01 PROCEDURE — 1159F PR MEDICATION LIST DOCUMENTED IN MEDICAL RECORD: ICD-10-PCS | Mod: CPTII,S$GLB,, | Performed by: FAMILY MEDICINE

## 2022-12-01 PROCEDURE — 3075F PR MOST RECENT SYSTOLIC BLOOD PRESS GE 130-139MM HG: ICD-10-PCS | Mod: CPTII,S$GLB,, | Performed by: FAMILY MEDICINE

## 2022-12-01 PROCEDURE — 4010F ACE/ARB THERAPY RXD/TAKEN: CPT | Mod: CPTII,S$GLB,, | Performed by: FAMILY MEDICINE

## 2022-12-01 PROCEDURE — 99214 PR OFFICE/OUTPT VISIT, EST, LEVL IV, 30-39 MIN: ICD-10-PCS | Mod: S$GLB,,, | Performed by: FAMILY MEDICINE

## 2022-12-01 PROCEDURE — 1160F PR REVIEW ALL MEDS BY PRESCRIBER/CLIN PHARMACIST DOCUMENTED: ICD-10-PCS | Mod: CPTII,S$GLB,, | Performed by: FAMILY MEDICINE

## 2022-12-01 PROCEDURE — 3075F SYST BP GE 130 - 139MM HG: CPT | Mod: CPTII,S$GLB,, | Performed by: FAMILY MEDICINE

## 2022-12-01 RX ORDER — DEXTROAMPHETAMINE SACCHARATE, AMPHETAMINE ASPARTATE, DEXTROAMPHETAMINE SULFATE AND AMPHETAMINE SULFATE 7.5; 7.5; 7.5; 7.5 MG/1; MG/1; MG/1; MG/1
30 TABLET ORAL 2 TIMES DAILY
Qty: 60 TABLET | Refills: 0 | Status: SHIPPED | OUTPATIENT
Start: 2023-02-01 | End: 2022-12-09 | Stop reason: SDUPTHER

## 2022-12-01 RX ORDER — NAPROXEN 500 MG/1
500 TABLET ORAL 2 TIMES DAILY
Qty: 30 TABLET | Refills: 0 | Status: SHIPPED | OUTPATIENT
Start: 2022-12-01 | End: 2023-03-06 | Stop reason: SDUPTHER

## 2022-12-01 RX ORDER — DEXTROAMPHETAMINE SACCHARATE, AMPHETAMINE ASPARTATE, DEXTROAMPHETAMINE SULFATE AND AMPHETAMINE SULFATE 7.5; 7.5; 7.5; 7.5 MG/1; MG/1; MG/1; MG/1
30 TABLET ORAL 2 TIMES DAILY
Qty: 60 TABLET | Refills: 0 | Status: SHIPPED | OUTPATIENT
Start: 2022-12-01 | End: 2022-12-07 | Stop reason: SDUPTHER

## 2022-12-01 RX ORDER — DEXTROAMPHETAMINE SACCHARATE, AMPHETAMINE ASPARTATE, DEXTROAMPHETAMINE SULFATE AND AMPHETAMINE SULFATE 7.5; 7.5; 7.5; 7.5 MG/1; MG/1; MG/1; MG/1
30 TABLET ORAL 2 TIMES DAILY
Qty: 60 TABLET | Refills: 0 | Status: SHIPPED | OUTPATIENT
Start: 2022-12-31 | End: 2022-12-07 | Stop reason: SDUPTHER

## 2022-12-01 NOTE — PATIENT INSTRUCTIONS
[facility]  [address] [address]  Phone: [phone]  Fax: [phone]    Naproxen and adderall will be sent after clinic     Xray 1st floor at [facility] registration

## 2022-12-04 NOTE — PROGRESS NOTES
Subjective:       Patient ID: Priscilla Wall is a 41 y.o. female.    Chief Complaint: Shoulder Pain    Attention deficit disorder.  Doing well.  On her medication regularly.  Cardiovascular no chest pain no palpitations.  Hypertension is controlled.  Right shoulder pain for 2 weeks.  Or possibly little longer.  No known injury.    Physical examination.  Vital signs noted.  Neck good range of motion without pain.  Chest clear.  Heart regular rate rhythm.  No carotid bruit.  Slight pain on abduction of the right arm.  And with internal rotation of the right shoulder.      Objective:        Assessment:       1. Attention deficit disorder, unspecified hyperactivity presence    2. Essential hypertension    3. Right shoulder pain, unspecified chronicity          Plan:       Attention deficit disorder, unspecified hyperactivity presence    Essential hypertension    Right shoulder pain, unspecified chronicity  -     X-ray Shoulder 2 or More Views Right; Future; Expected date: 12/01/2022  -     Ambulatory referral/consult to Physical/Occupational Therapy; Future; Expected date: 12/08/2022    Other orders  -     dextroamphetamine-amphetamine 30 mg Tab; Take 1 tablet (30 mg total) by mouth 2 (two) times a day.  Dispense: 60 tablet; Refill: 0  -     dextroamphetamine-amphetamine 30 mg Tab; Take 1 tablet (30 mg total) by mouth 2 (two) times a day.  Dispense: 60 tablet; Refill: 0  -     dextroamphetamine-amphetamine 30 mg Tab; Take 1 tablet (30 mg total) by mouth 2 (two) times a day.  Dispense: 60 tablet; Refill: 0  -     naproxen (NAPROSYN) 500 MG tablet; Take 1 tablet (500 mg total) by mouth 2 (two) times daily.  Dispense: 30 tablet; Refill: 0    Refill her Adderall 30 mg b.i.d. 3 prescriptions for 60 each.  Declines flu shot.  X-ray the right shoulder.  Naprosyn 500 mg b.i.d. 30 pills.  Decrease use.  Can order physical therapy for the shoulder if it is not clearing up.

## 2022-12-05 NOTE — TELEPHONE ENCOUNTER
----- Message from Yudi Thompson sent at 12/5/2022  2:52 PM CST -----  Contact: pt  Type: Needs Medical Advice    Who Called: pt  Best Call Back Number: [phone]    Inquiry/Question: pt states that [facility] has been out of the  dextroamphetamine-amphetamine 30 mg Tab. [facility] and [facility] it        Thank you~

## 2022-12-06 RX ORDER — DEXTROAMPHETAMINE SACCHARATE, AMPHETAMINE ASPARTATE, DEXTROAMPHETAMINE SULFATE AND AMPHETAMINE SULFATE 7.5; 7.5; 7.5; 7.5 MG/1; MG/1; MG/1; MG/1
30 TABLET ORAL 2 TIMES DAILY
Qty: 60 TABLET | Refills: 0 | OUTPATIENT
Start: 2023-02-01

## 2022-12-06 RX ORDER — DEXTROAMPHETAMINE SACCHARATE, AMPHETAMINE ASPARTATE, DEXTROAMPHETAMINE SULFATE AND AMPHETAMINE SULFATE 7.5; 7.5; 7.5; 7.5 MG/1; MG/1; MG/1; MG/1
30 TABLET ORAL 2 TIMES DAILY
Qty: 60 TABLET | Refills: 0 | OUTPATIENT
Start: 2022-12-31

## 2022-12-06 RX ORDER — DEXTROAMPHETAMINE SACCHARATE, AMPHETAMINE ASPARTATE, DEXTROAMPHETAMINE SULFATE AND AMPHETAMINE SULFATE 7.5; 7.5; 7.5; 7.5 MG/1; MG/1; MG/1; MG/1
30 TABLET ORAL 2 TIMES DAILY
Qty: 60 TABLET | Refills: 0 | OUTPATIENT
Start: 2022-12-06

## 2022-12-07 RX ORDER — DEXTROAMPHETAMINE SACCHARATE, AMPHETAMINE ASPARTATE, DEXTROAMPHETAMINE SULFATE AND AMPHETAMINE SULFATE 7.5; 7.5; 7.5; 7.5 MG/1; MG/1; MG/1; MG/1
30 TABLET ORAL 2 TIMES DAILY
Qty: 60 TABLET | Refills: 0 | Status: SHIPPED | OUTPATIENT
Start: 2022-12-31 | End: 2023-03-06 | Stop reason: SDUPTHER

## 2022-12-07 RX ORDER — DEXTROAMPHETAMINE SACCHARATE, AMPHETAMINE ASPARTATE, DEXTROAMPHETAMINE SULFATE AND AMPHETAMINE SULFATE 7.5; 7.5; 7.5; 7.5 MG/1; MG/1; MG/1; MG/1
30 TABLET ORAL 2 TIMES DAILY
Qty: 60 TABLET | Refills: 0 | Status: SHIPPED | OUTPATIENT
Start: 2023-02-06 | End: 2023-03-06 | Stop reason: SDUPTHER

## 2022-12-07 RX ORDER — DEXTROAMPHETAMINE SACCHARATE, AMPHETAMINE ASPARTATE, DEXTROAMPHETAMINE SULFATE AND AMPHETAMINE SULFATE 7.5; 7.5; 7.5; 7.5 MG/1; MG/1; MG/1; MG/1
30 TABLET ORAL 2 TIMES DAILY
Qty: 60 TABLET | Refills: 0 | Status: SHIPPED | OUTPATIENT
Start: 2023-01-06 | End: 2023-03-06 | Stop reason: SDUPTHER

## 2022-12-07 NOTE — TELEPHONE ENCOUNTER
----- Message from Bomo Mcguire sent at 12/7/2022  2:10 PM CST -----  Type: Needs Medical Advice  Who Called:  Pt  Symptoms (please be specific):  Refill  How long has patient had these symptoms:    Pharmacy name and phone #:      [id number] [place], [place] - [address]  [address]  [facility] [address]  Phone: [phone] Fax: [phone]      Best Call Back Number: [phone]   Additional Information: checking on the status of her refill sts the pharm has in it stock right now.  She was under the impression that it was being sent when she went by.  Please advise -- Thank you

## 2022-12-09 RX ORDER — DEXTROAMPHETAMINE SACCHARATE, AMPHETAMINE ASPARTATE, DEXTROAMPHETAMINE SULFATE AND AMPHETAMINE SULFATE 7.5; 7.5; 7.5; 7.5 MG/1; MG/1; MG/1; MG/1
30 TABLET ORAL 2 TIMES DAILY
Qty: 60 TABLET | Refills: 0 | OUTPATIENT
Start: 2022-12-31

## 2022-12-09 RX ORDER — DEXTROAMPHETAMINE SACCHARATE, AMPHETAMINE ASPARTATE, DEXTROAMPHETAMINE SULFATE AND AMPHETAMINE SULFATE 7.5; 7.5; 7.5; 7.5 MG/1; MG/1; MG/1; MG/1
30 TABLET ORAL 2 TIMES DAILY
Qty: 60 TABLET | Refills: 0 | Status: SHIPPED | OUTPATIENT
Start: 2022-12-09 | End: 2023-03-06 | Stop reason: SDUPTHER

## 2023-01-25 DIAGNOSIS — Z12.31 OTHER SCREENING MAMMOGRAM: ICD-10-CM

## 2023-02-09 DIAGNOSIS — I10 ESSENTIAL HYPERTENSION: ICD-10-CM

## 2023-03-06 ENCOUNTER — OFFICE VISIT (OUTPATIENT)
Dept: FAMILY MEDICINE | Facility: CLINIC | Age: 43
End: 2023-03-06
Payer: MEDICARE

## 2023-03-06 VITALS
RESPIRATION RATE: 18 BRPM | TEMPERATURE: 98 F | OXYGEN SATURATION: 100 % | HEART RATE: 74 BPM | BODY MASS INDEX: 31.24 KG/M2 | HEIGHT: 66 IN | WEIGHT: 194.38 LBS

## 2023-03-06 DIAGNOSIS — N94.6 MENSTRUAL CRAMPS: Primary | ICD-10-CM

## 2023-03-06 DIAGNOSIS — F98.8 ATTENTION DEFICIT DISORDER (ADD) WITHOUT HYPERACTIVITY: ICD-10-CM

## 2023-03-06 DIAGNOSIS — S60.569S: ICD-10-CM
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
## 2023-03-06 DIAGNOSIS — B00.1 HERPES LABIALIS: ICD-10-CM

## 2023-03-06 DIAGNOSIS — I10 ESSENTIAL HYPERTENSION: ICD-10-CM

## 2023-03-06 DIAGNOSIS — F32.5 MAJOR DEPRESSIVE DISORDER IN REMISSION, UNSPECIFIED WHETHER RECURRENT: ICD-10-CM

## 2023-03-06 DIAGNOSIS — E78.5 HYPERLIPIDEMIA, UNSPECIFIED HYPERLIPIDEMIA TYPE: ICD-10-CM

## 2023-03-06 DIAGNOSIS — R79.9 ABNORMAL BLOOD CHEMISTRY: ICD-10-CM

## 2023-03-06 DIAGNOSIS — R53.83 FATIGUE, UNSPECIFIED TYPE: ICD-10-CM

## 2023-03-06 DIAGNOSIS — D50.9 IRON DEFICIENCY ANEMIA, UNSPECIFIED IRON DEFICIENCY ANEMIA TYPE: ICD-10-CM

## 2023-03-06 PROCEDURE — 1159F MED LIST DOCD IN RCRD: CPT | Mod: CPTII,S$GLB,,

## 2023-03-06 PROCEDURE — 4010F ACE/ARB THERAPY RXD/TAKEN: CPT | Mod: CPTII,S$GLB,,

## 2023-03-06 PROCEDURE — 99214 OFFICE O/P EST MOD 30 MIN: CPT | Mod: S$GLB,,,

## 2023-03-06 PROCEDURE — 3008F PR BODY MASS INDEX (BMI) DOCUMENTED: ICD-10-PCS | Mod: CPTII,S$GLB,,

## 2023-03-06 PROCEDURE — 1159F PR MEDICATION LIST DOCUMENTED IN MEDICAL RECORD: ICD-10-PCS | Mod: CPTII,S$GLB,,

## 2023-03-06 PROCEDURE — 3008F BODY MASS INDEX DOCD: CPT | Mod: CPTII,S$GLB,,

## 2023-03-06 PROCEDURE — 4010F PR ACE/ARB THEARPY RXD/TAKEN: ICD-10-PCS | Mod: CPTII,S$GLB,,

## 2023-03-06 PROCEDURE — 99214 PR OFFICE/OUTPT VISIT, EST, LEVL IV, 30-39 MIN: ICD-10-PCS | Mod: S$GLB,,,

## 2023-03-06 RX ORDER — DEXTROAMPHETAMINE SACCHARATE, AMPHETAMINE ASPARTATE, DEXTROAMPHETAMINE SULFATE AND AMPHETAMINE SULFATE 7.5; 7.5; 7.5; 7.5 MG/1; MG/1; MG/1; MG/1
30 TABLET ORAL 2 TIMES DAILY
Qty: 60 TABLET | Refills: 0 | Status: SHIPPED | OUTPATIENT
Start: 2023-03-06 | End: 2023-06-12 | Stop reason: SDUPTHER

## 2023-03-06 RX ORDER — CITALOPRAM 20 MG/1
40 TABLET, FILM COATED ORAL DAILY
Qty: 180 TABLET | Refills: 1 | Status: SHIPPED | OUTPATIENT
Start: 2023-03-06 | End: 2023-09-14

## 2023-03-06 RX ORDER — VALACYCLOVIR HYDROCHLORIDE 1 G/1
500 TABLET, FILM COATED ORAL DAILY
Qty: 30 TABLET | Refills: 0 | Status: SHIPPED | OUTPATIENT
Start: 2023-03-06 | End: 2023-03-23

## 2023-03-06 RX ORDER — NAPROXEN 500 MG/1
500 TABLET ORAL 2 TIMES DAILY
Qty: 30 TABLET | Refills: 0 | Status: SHIPPED | OUTPATIENT
Start: 2023-03-06

## 2023-03-06 RX ORDER — DEXTROAMPHETAMINE SACCHARATE, AMPHETAMINE ASPARTATE, DEXTROAMPHETAMINE SULFATE AND AMPHETAMINE SULFATE 7.5; 7.5; 7.5; 7.5 MG/1; MG/1; MG/1; MG/1
30 TABLET ORAL 2 TIMES DAILY
Qty: 60 TABLET | Refills: 0 | Status: SHIPPED | OUTPATIENT
Start: 2023-04-06 | End: 2023-06-12 | Stop reason: SDUPTHER

## 2023-03-06 RX ORDER — MUPIROCIN 20 MG/G
OINTMENT TOPICAL 3 TIMES DAILY
Qty: 22 G | Refills: 0 | Status: SHIPPED | OUTPATIENT
Start: 2023-03-06

## 2023-03-06 RX ORDER — MUPIROCIN 20 MG/G
OINTMENT TOPICAL
Qty: 1 G | Refills: 0 | Status: CANCELLED | OUTPATIENT
Start: 2023-03-06

## 2023-03-06 RX ORDER — DEXTROAMPHETAMINE SACCHARATE, AMPHETAMINE ASPARTATE, DEXTROAMPHETAMINE SULFATE AND AMPHETAMINE SULFATE 7.5; 7.5; 7.5; 7.5 MG/1; MG/1; MG/1; MG/1
30 TABLET ORAL 2 TIMES DAILY
Qty: 60 TABLET | Refills: 0 | Status: SHIPPED | OUTPATIENT
Start: 2023-05-06 | End: 2023-06-12 | Stop reason: SDUPTHER

## 2023-03-06 NOTE — PROGRESS NOTES
Subjective:       Patient ID: Priscilla Wall is a 42 y.o. female.    Chief Complaint: Follow-up (3 month)    Patient presents to clinic for medication refills. Doing well overall.  No problems to address today.    ADD: taking adderall daily.  Doing well.   checked.   Cold sores: taking valtrex as needed. Needs refill.  Requests refill on Naproxen for menstrual cramping   She is requesting mupirocin to keep on hand for insect bites.     Review of patient's allergies indicates:   Allergen Reactions    Sulfa (sulfonamide antibiotics) Anaphylaxis     Social Determinants of Health     Tobacco Use: Medium Risk    Smoking Tobacco Use: Former    Smokeless Tobacco Use: Never    Passive Exposure: Not on file   Alcohol Use: Not on file   Financial Resource Strain: Not on file   Food Insecurity: Not on file   Transportation Needs: Not on file   Physical Activity: Not on file   Stress: Not on file   Social Connections: Not on file   Housing Stability: Not on file   Depression: Low Risk     Last PHQ Score: 0      Past Medical History:   Diagnosis Date    ADD (attention deficit disorder)     Depression     Foot fracture, right     Hypertension     Irregular menstrual bleeding 06/2021    MRSA infection 06/02/2017      Past Surgical History:   Procedure Laterality Date    FOOT HARDWARE REMOVAL      INCISION AND DRAINAGE FOOT Right     ORIF 5th metatarsal Right     TUBAL LIGATION Bilateral 2016      Social History     Socioeconomic History    Marital status: Single         Current Outpatient Medications:     trandolapriL (MAVIK) 2 MG Tab, TAKE 1 TABLET(2 MG) BY MOUTH EVERY DAY, Disp: 90 tablet, Rfl: 1    citalopram (CELEXA) 20 MG tablet, Take 2 tablets (40 mg total) by mouth once daily., Disp: 180 tablet, Rfl: 1    [START ON 5/6/2023] dextroamphetamine-amphetamine 30 mg Tab, Take 1 tablet (30 mg total) by mouth 2 (two) times a day., Disp: 60 tablet, Rfl: 0    [START ON 4/6/2023] dextroamphetamine-amphetamine 30 mg Tab, Take 1  tablet (30 mg total) by mouth 2 (two) times a day., Disp: 60 tablet, Rfl: 0    dextroamphetamine-amphetamine 30 mg Tab, Take 1 tablet (30 mg total) by mouth 2 (two) times a day., Disp: 60 tablet, Rfl: 0    mupirocin (BACTROBAN) 2 % ointment, Apply topically 3 (three) times daily., Disp: 22 g, Rfl: 0    naproxen (NAPROSYN) 500 MG tablet, Take 1 tablet (500 mg total) by mouth 2 (two) times daily., Disp: 30 tablet, Rfl: 0    VALTREX 1 gram tablet, Take 0.5 tablets (500 mg total) by mouth once daily., Disp: 30 tablet, Rfl: 0    Lab Results   Component Value Date    WBC 7.1 02/03/2022    HGB 12.7 02/03/2022    HCT 39.8 02/03/2022     02/03/2022    CHOL 182 02/03/2022    TRIG 157 (H) 02/03/2022    HDL 57 02/03/2022    ALT 10 02/03/2022    AST 12 02/03/2022     02/03/2022    K 4.6 02/03/2022     02/03/2022    CREATININE 0.80 02/03/2022    BUN 11 02/03/2022    CO2 28 02/03/2022    TSH 1.29 02/03/2022    INR 1.0 06/01/2017       Review of Systems   Constitutional:  Negative for chills and fever.   Respiratory:  Negative for chest tightness and shortness of breath.    Cardiovascular:  Negative for palpitations.   Psychiatric/Behavioral:  Positive for decreased concentration.      Objective:      Physical Exam  Vitals reviewed.   Constitutional:       Appearance: Normal appearance.   Cardiovascular:      Rate and Rhythm: Normal rate and regular rhythm.      Pulses: Normal pulses.           Radial pulses are 2+ on the right side and 2+ on the left side.        Posterior tibial pulses are 2+ on the right side and 2+ on the left side.      Heart sounds: Normal heart sounds, S1 normal and S2 normal.   Pulmonary:      Effort: Pulmonary effort is normal.      Breath sounds: Normal breath sounds.   Abdominal:      General: Abdomen is flat. Bowel sounds are normal.      Palpations: Abdomen is soft.   Musculoskeletal:      Right lower leg: No edema.      Left lower leg: No edema.   Skin:     General: Skin is warm and  dry.      Capillary Refill: Capillary refill takes less than 2 seconds.   Neurological:      Mental Status: She is alert and oriented to person, place, and time.   Psychiatric:         Mood and Affect: Mood normal.         Behavior: Behavior normal.         Thought Content: Thought content normal.         Judgment: Judgment normal.       Assessment:       1. Menstrual cramps    2. Attention deficit disorder (ADD) without hyperactivity    3. Insect bite of hand, unspecified laterality, sequela    4. Herpes labialis    5. Essential hypertension    6. Iron deficiency anemia, unspecified iron deficiency anemia type    7. Major depressive disorder in remission, unspecified whether recurrent    8. Fatigue, unspecified type    9. Hyperlipidemia, unspecified hyperlipidemia type    10. Abnormal blood chemistry          Plan:       Priscilla was seen today for follow-up.    Diagnoses and all orders for this visit:    Menstrual cramps  -     naproxen (NAPROSYN) 500 MG tablet; Take 1 tablet (500 mg total) by mouth 2 (two) times daily.    Attention deficit disorder (ADD) without hyperactivity  -     dextroamphetamine-amphetamine 30 mg Tab; Take 1 tablet (30 mg total) by mouth 2 (two) times a day.  -     dextroamphetamine-amphetamine 30 mg Tab; Take 1 tablet (30 mg total) by mouth 2 (two) times a day.  -     dextroamphetamine-amphetamine 30 mg Tab; Take 1 tablet (30 mg total) by mouth 2 (two) times a day.    Insect bite of hand, unspecified laterality, sequela  -     mupirocin (BACTROBAN) 2 % ointment; Apply topically 3 (three) times daily.    Herpes labialis  -     VALTREX 1 gram tablet; Take 0.5 tablets (500 mg total) by mouth once daily.    Essential hypertension    Iron deficiency anemia, unspecified iron deficiency anemia type  -     CBC Auto Differential; Future  -     CBC Auto Differential    Major depressive disorder in remission, unspecified whether recurrent  -     citalopram (CELEXA) 20 MG tablet; Take 2 tablets (40 mg  total) by mouth once daily.    Fatigue, unspecified type  -     TSH; Future  -     TSH    Hyperlipidemia, unspecified hyperlipidemia type  -     Lipid Panel; Future  -     Lipid Panel    Abnormal blood chemistry  -     Hemoglobin A1C; Future  -     Hemoglobin A1C    Other orders  The following orders have not been finalized:  -     Cancel: mupirocin (BACTROBAN) 2 % ointment     Have fasting labs done as ordered  ADD: doing well on Adderall. Refilled for  3 months.   Depression controlled with current medications. Refills sent.  Patient declines vaccines today.   Follow up in 3 months.

## 2023-06-12 ENCOUNTER — OFFICE VISIT (OUTPATIENT)
Dept: FAMILY MEDICINE | Facility: CLINIC | Age: 43
End: 2023-06-12
Payer: MEDICAID

## 2023-06-12 VITALS
HEIGHT: 66 IN | BODY MASS INDEX: 31.52 KG/M2 | HEART RATE: 84 BPM | DIASTOLIC BLOOD PRESSURE: 76 MMHG | TEMPERATURE: 97 F | OXYGEN SATURATION: 99 % | SYSTOLIC BLOOD PRESSURE: 122 MMHG | WEIGHT: 196.13 LBS

## 2023-06-12 DIAGNOSIS — M54.2 CERVICAL PAIN: ICD-10-CM

## 2023-06-12 DIAGNOSIS — I10 ESSENTIAL HYPERTENSION: Primary | ICD-10-CM

## 2023-06-12 DIAGNOSIS — F98.8 ATTENTION DEFICIT DISORDER (ADD) WITHOUT HYPERACTIVITY: ICD-10-CM

## 2023-06-12 DIAGNOSIS — F32.1 CURRENT MODERATE EPISODE OF MAJOR DEPRESSIVE DISORDER WITHOUT PRIOR EPISODE: ICD-10-CM

## 2023-06-12 DIAGNOSIS — M89.8X1 PAIN IN SCAPULA: ICD-10-CM

## 2023-06-12 PROCEDURE — 3074F SYST BP LT 130 MM HG: CPT | Mod: CPTII,S$GLB,, | Performed by: FAMILY MEDICINE

## 2023-06-12 PROCEDURE — 3008F PR BODY MASS INDEX (BMI) DOCUMENTED: ICD-10-PCS | Mod: CPTII,S$GLB,, | Performed by: FAMILY MEDICINE

## 2023-06-12 PROCEDURE — 3078F PR MOST RECENT DIASTOLIC BLOOD PRESSURE < 80 MM HG: ICD-10-PCS | Mod: CPTII,S$GLB,, | Performed by: FAMILY MEDICINE

## 2023-06-12 PROCEDURE — 99214 OFFICE O/P EST MOD 30 MIN: CPT | Mod: S$GLB,,, | Performed by: FAMILY MEDICINE

## 2023-06-12 PROCEDURE — 1159F MED LIST DOCD IN RCRD: CPT | Mod: CPTII,S$GLB,, | Performed by: FAMILY MEDICINE

## 2023-06-12 PROCEDURE — 3074F PR MOST RECENT SYSTOLIC BLOOD PRESSURE < 130 MM HG: ICD-10-PCS | Mod: CPTII,S$GLB,, | Performed by: FAMILY MEDICINE

## 2023-06-12 PROCEDURE — 99214 PR OFFICE/OUTPT VISIT, EST, LEVL IV, 30-39 MIN: ICD-10-PCS | Mod: S$GLB,,, | Performed by: FAMILY MEDICINE

## 2023-06-12 PROCEDURE — 1160F PR REVIEW ALL MEDS BY PRESCRIBER/CLIN PHARMACIST DOCUMENTED: ICD-10-PCS | Mod: CPTII,S$GLB,, | Performed by: FAMILY MEDICINE

## 2023-06-12 PROCEDURE — 4010F ACE/ARB THERAPY RXD/TAKEN: CPT | Mod: CPTII,S$GLB,, | Performed by: FAMILY MEDICINE

## 2023-06-12 PROCEDURE — 3078F DIAST BP <80 MM HG: CPT | Mod: CPTII,S$GLB,, | Performed by: FAMILY MEDICINE

## 2023-06-12 PROCEDURE — 1159F PR MEDICATION LIST DOCUMENTED IN MEDICAL RECORD: ICD-10-PCS | Mod: CPTII,S$GLB,, | Performed by: FAMILY MEDICINE

## 2023-06-12 PROCEDURE — 1160F RVW MEDS BY RX/DR IN RCRD: CPT | Mod: CPTII,S$GLB,, | Performed by: FAMILY MEDICINE

## 2023-06-12 PROCEDURE — 4010F PR ACE/ARB THEARPY RXD/TAKEN: ICD-10-PCS | Mod: CPTII,S$GLB,, | Performed by: FAMILY MEDICINE

## 2023-06-12 PROCEDURE — 3008F BODY MASS INDEX DOCD: CPT | Mod: CPTII,S$GLB,, | Performed by: FAMILY MEDICINE

## 2023-06-12 RX ORDER — DEXTROAMPHETAMINE SACCHARATE, AMPHETAMINE ASPARTATE, DEXTROAMPHETAMINE SULFATE AND AMPHETAMINE SULFATE 7.5; 7.5; 7.5; 7.5 MG/1; MG/1; MG/1; MG/1
30 TABLET ORAL 2 TIMES DAILY
Qty: 60 TABLET | Refills: 0 | Status: SHIPPED | OUTPATIENT
Start: 2023-06-12 | End: 2023-07-11 | Stop reason: SDUPTHER

## 2023-06-12 RX ORDER — VENLAFAXINE HYDROCHLORIDE 75 MG/1
75 CAPSULE, EXTENDED RELEASE ORAL DAILY
Qty: 30 CAPSULE | Refills: 1 | Status: SHIPPED | OUTPATIENT
Start: 2023-06-12 | End: 2023-09-12 | Stop reason: SDUPTHER

## 2023-06-12 RX ORDER — DEXTROAMPHETAMINE SACCHARATE, AMPHETAMINE ASPARTATE, DEXTROAMPHETAMINE SULFATE AND AMPHETAMINE SULFATE 7.5; 7.5; 7.5; 7.5 MG/1; MG/1; MG/1; MG/1
30 TABLET ORAL 2 TIMES DAILY
Qty: 60 TABLET | Refills: 0 | Status: SHIPPED | OUTPATIENT
Start: 2023-08-12 | End: 2023-09-14 | Stop reason: SDUPTHER

## 2023-06-12 RX ORDER — DEXTROAMPHETAMINE SACCHARATE, AMPHETAMINE ASPARTATE, DEXTROAMPHETAMINE SULFATE AND AMPHETAMINE SULFATE 7.5; 7.5; 7.5; 7.5 MG/1; MG/1; MG/1; MG/1
30 TABLET ORAL 2 TIMES DAILY
Qty: 60 TABLET | Refills: 0 | Status: SHIPPED | OUTPATIENT
Start: 2023-07-12 | End: 2023-09-14 | Stop reason: SDUPTHER

## 2023-06-12 NOTE — PATIENT INSTRUCTIONS
Decrease Celexa to 10 mg for 14 days then discontinue. then start Effexor     Follow up in 6 weeks with nurse putnam

## 2023-06-14 NOTE — PROGRESS NOTES
Subjective:       Patient ID: Priscilla Wall is a 42 y.o. female.    Chief Complaint: Follow-up    Attention deficit disorder.  Needs medication refilled.  Doing okay at work.  Cardiovascular no chest pain or palpitations.  Does work fiber 6 hours a day.  Feels she needs her medication to function.  Depression.  Celexa at 20 mg still depressed.  Hypertension doing okay on her medication.  No chest pain or palpitations.  Has a identity theft issue.  Having some cervical pain and scapular pain.  We sent her to physical therapy p.o. is not able to go.    Physical examination.  Vital signs are noted.  Neck without bruit.  Range of motion is okay without pain.  She is tender over the right scapula.  Chest clear to auscultation.  Heart regular rate rhythm.  Extremities without edema.  Neurologically intact.      Objective:        Assessment:       1. Essential hypertension    2. Attention deficit disorder (ADD) without hyperactivity    3. Current moderate episode of major depressive disorder without prior episode    4. Cervical pain    5. Pain in scapula        Plan:       Essential hypertension    Attention deficit disorder (ADD) without hyperactivity  -     dextroamphetamine-amphetamine 30 mg Tab; Take 1 tablet (30 mg total) by mouth 2 (two) times a day.  Dispense: 60 tablet; Refill: 0  -     dextroamphetamine-amphetamine 30 mg Tab; Take 1 tablet (30 mg total) by mouth 2 (two) times a day.  Dispense: 60 tablet; Refill: 0  -     dextroamphetamine-amphetamine 30 mg Tab; Take 1 tablet (30 mg total) by mouth 2 (two) times a day.  Dispense: 60 tablet; Refill: 0    Current moderate episode of major depressive disorder without prior episode    Cervical pain    Pain in scapula    Other orders  -     venlafaxine (EFFEXOR XR) 75 MG 24 hr capsule; Take 1 capsule (75 mg total) by mouth once daily.  Dispense: 30 capsule; Refill: 1    Refill her Adderall 30 mg b.i.d. 3 prescriptions for 60 each.  Decrease the Celexa 10 mg daily  for 14 days then discontinue it then start Effexor XR 75 daily 30 with a refill.  Follow-up here with nurse practitioner in 6 weeks.  Physical therapy for the neck if she desires.

## 2023-06-22 RX ORDER — TRANDOLAPRIL 2 MG/1
2 TABLET ORAL DAILY
Qty: 90 TABLET | Refills: 1 | Status: SHIPPED | OUTPATIENT
Start: 2023-06-22 | End: 2023-09-14 | Stop reason: CLARIF

## 2023-06-22 NOTE — TELEPHONE ENCOUNTER
----- Message from Mike Molina sent at 6/22/2023  4:55 PM CDT -----  Regarding: Refill  Contact: patient  Type:  RX Refill Request    Who Called: patient  Refill or New Rx:refill  RX Name and Strength:trandolapriL (MAVIK) 2 MG Tab  How is the patient currently taking it? (ex. 1XDay):  Is this a 30 day or 90 day RX:  Preferred Pharmacy with phone number:  [facility][id number] - [facility] [address] [place] [address] AT [facility] [address] [facility] [address] [facility]  [address] [place] [address]  Phone: [phone] Fax: [phone]  Local or Mail Order:local  Ordering Provider:Tom North  Would the patient rather a call back or a response via [facility]? call  Best Call Back Number:[phone]  Additional Information: patient called regarding a refill

## 2023-07-11 DIAGNOSIS — F98.8 ATTENTION DEFICIT DISORDER (ADD) WITHOUT HYPERACTIVITY: ICD-10-CM

## 2023-07-11 RX ORDER — DEXTROAMPHETAMINE SACCHARATE, AMPHETAMINE ASPARTATE, DEXTROAMPHETAMINE SULFATE AND AMPHETAMINE SULFATE 7.5; 7.5; 7.5; 7.5 MG/1; MG/1; MG/1; MG/1
30 TABLET ORAL 2 TIMES DAILY
Qty: 60 TABLET | Refills: 0 | Status: SHIPPED | OUTPATIENT
Start: 2023-07-11 | End: 2023-09-14 | Stop reason: SDUPTHER

## 2023-07-11 NOTE — TELEPHONE ENCOUNTER
----- Message from Shauna Weeks sent at 7/11/2023  9:41 AM CDT -----  Contact: Patient  Type:  RX Refill Request    Who Called:   Patient  Refill or New Rx:  Refill    RX Name and Strength:   dextroamphetamine-amphetamine 30 mg Tab    Preferred Pharmacy with phone number:      [facility] #[id number] - [place] [address] [address] [place] [address] [address] [address]  Phone: [phone] Fax: [phone]    Local or Mail Order:  Local  Ordering Provider:  Dr North    Would the patient rather a call back or a response via [facility]?   Call back  Best Call Back Number:  [phone]    Additional Information:   States she is due for a refill but says no refills on bottle - please call in refill to [facility] - please call to advise - thank you

## 2023-07-31 ENCOUNTER — TELEPHONE (OUTPATIENT)
Dept: FAMILY MEDICINE | Facility: CLINIC | Age: 43
End: 2023-07-31
Payer: MEDICAID

## 2023-07-31 NOTE — TELEPHONE ENCOUNTER
----- Message from Cris Bergeron sent at 7/31/2023 10:41 AM CDT -----  Contact: Yudi  Type: Needs Medical Advice    Who Called: Shala Tomas  Best Call Back Number: [phone]  Additional  Information: Requesting a call back to know if a PA has been obtained for Trandolatril 2 MG tablets. PT called pharm to find out what's going on   Please Advise- Thank you

## 2023-08-01 ENCOUNTER — TELEPHONE (OUTPATIENT)
Dept: FAMILY MEDICINE | Facility: CLINIC | Age: 43
End: 2023-08-01
Payer: MEDICAID

## 2023-08-01 NOTE — TELEPHONE ENCOUNTER
----- Message from Soo Mtahew sent at 8/1/2023 10:14 AM CDT -----  Contact: [facility]  Type:  Pharmacy Calling to Clarify an RX    Name of Caller:  Vivian  [facility] Name:  [facility]  Prescription Name:  trandolapriL (MAVIK) 2 MG Tab  What do they need to clarify?:  Caity SANTIAGO  Best Call Back Number:  [phone]  Additional Information:  Thank You

## 2023-09-12 RX ORDER — VENLAFAXINE HYDROCHLORIDE 75 MG/1
75 CAPSULE, EXTENDED RELEASE ORAL DAILY
Qty: 30 CAPSULE | Refills: 0 | Status: SHIPPED | OUTPATIENT
Start: 2023-09-12 | End: 2023-09-14 | Stop reason: SDUPTHER

## 2023-09-12 NOTE — TELEPHONE ENCOUNTER
----- Message from Magno Douglas sent at 9/12/2023  9:39 AM CDT -----  Type:  Sooner Appointment Request    Caller is requesting a sooner appointment.  Caller declined first available appointment listed below.  Caller will not accept being placed on the waitlist and is requesting a message be sent to doctor.    Name of Caller:  pt  When is the first available appointment?  none  Symptoms:  med refill-- said she out of her meds--please call and advise  Best Call Back Number:  [phone] (home)     Additional Information:  thank you

## 2023-09-14 ENCOUNTER — OFFICE VISIT (OUTPATIENT)
Dept: FAMILY MEDICINE | Facility: CLINIC | Age: 43
End: 2023-09-14
Payer: MEDICAID

## 2023-09-14 VITALS
OXYGEN SATURATION: 98 % | SYSTOLIC BLOOD PRESSURE: 124 MMHG | BODY MASS INDEX: 30.8 KG/M2 | HEIGHT: 66 IN | RESPIRATION RATE: 18 BRPM | HEART RATE: 78 BPM | WEIGHT: 191.63 LBS | DIASTOLIC BLOOD PRESSURE: 88 MMHG | TEMPERATURE: 98 F

## 2023-09-14 DIAGNOSIS — F32.1 CURRENT MODERATE EPISODE OF MAJOR DEPRESSIVE DISORDER WITHOUT PRIOR EPISODE: ICD-10-CM

## 2023-09-14 DIAGNOSIS — I10 ESSENTIAL HYPERTENSION: Primary | ICD-10-CM

## 2023-09-14 DIAGNOSIS — F98.8 ATTENTION DEFICIT DISORDER (ADD) WITHOUT HYPERACTIVITY: ICD-10-CM

## 2023-09-14 DIAGNOSIS — M79.89 SWELLING OF RIGHT FOOT: ICD-10-CM

## 2023-09-14 DIAGNOSIS — Z98.890 S/P ORIF (OPEN REDUCTION INTERNAL FIXATION) FRACTURE: ICD-10-CM

## 2023-09-14 DIAGNOSIS — Z87.81 S/P ORIF (OPEN REDUCTION INTERNAL FIXATION) FRACTURE: ICD-10-CM

## 2023-09-14 DIAGNOSIS — M79.671 RIGHT FOOT PAIN: ICD-10-CM

## 2023-09-14 PROCEDURE — 3079F PR MOST RECENT DIASTOLIC BLOOD PRESSURE 80-89 MM HG: ICD-10-PCS | Mod: CPTII,S$GLB,,

## 2023-09-14 PROCEDURE — 4010F ACE/ARB THERAPY RXD/TAKEN: CPT | Mod: CPTII,S$GLB,,

## 2023-09-14 PROCEDURE — 99214 PR OFFICE/OUTPT VISIT, EST, LEVL IV, 30-39 MIN: ICD-10-PCS | Mod: S$GLB,,,

## 2023-09-14 PROCEDURE — 4010F PR ACE/ARB THEARPY RXD/TAKEN: ICD-10-PCS | Mod: CPTII,S$GLB,,

## 2023-09-14 PROCEDURE — 99214 OFFICE O/P EST MOD 30 MIN: CPT | Mod: S$GLB,,,

## 2023-09-14 PROCEDURE — 1159F PR MEDICATION LIST DOCUMENTED IN MEDICAL RECORD: ICD-10-PCS | Mod: CPTII,S$GLB,,

## 2023-09-14 PROCEDURE — 3079F DIAST BP 80-89 MM HG: CPT | Mod: CPTII,S$GLB,,

## 2023-09-14 PROCEDURE — 3008F BODY MASS INDEX DOCD: CPT | Mod: CPTII,S$GLB,,

## 2023-09-14 PROCEDURE — 3074F PR MOST RECENT SYSTOLIC BLOOD PRESSURE < 130 MM HG: ICD-10-PCS | Mod: CPTII,S$GLB,,

## 2023-09-14 PROCEDURE — 3008F PR BODY MASS INDEX (BMI) DOCUMENTED: ICD-10-PCS | Mod: CPTII,S$GLB,,

## 2023-09-14 PROCEDURE — 3074F SYST BP LT 130 MM HG: CPT | Mod: CPTII,S$GLB,,

## 2023-09-14 PROCEDURE — 1159F MED LIST DOCD IN RCRD: CPT | Mod: CPTII,S$GLB,,

## 2023-09-14 RX ORDER — VENLAFAXINE HYDROCHLORIDE 75 MG/1
75 CAPSULE, EXTENDED RELEASE ORAL DAILY
Qty: 90 CAPSULE | Refills: 1 | Status: SHIPPED | OUTPATIENT
Start: 2023-09-14 | End: 2023-12-18 | Stop reason: CLARIF

## 2023-09-14 RX ORDER — TRANDOLAPRIL 2 MG/1
2 TABLET ORAL DAILY
Qty: 90 TABLET | Refills: 1 | Status: CANCELLED | OUTPATIENT
Start: 2023-09-14

## 2023-09-14 RX ORDER — DEXTROAMPHETAMINE SACCHARATE, AMPHETAMINE ASPARTATE, DEXTROAMPHETAMINE SULFATE AND AMPHETAMINE SULFATE 7.5; 7.5; 7.5; 7.5 MG/1; MG/1; MG/1; MG/1
30 TABLET ORAL 2 TIMES DAILY
Qty: 60 TABLET | Refills: 0 | Status: SHIPPED | OUTPATIENT
Start: 2023-11-14 | End: 2023-12-18 | Stop reason: SDUPTHER

## 2023-09-14 RX ORDER — LISINOPRIL 5 MG/1
5 TABLET ORAL DAILY
Qty: 90 TABLET | Refills: 3 | Status: SHIPPED | OUTPATIENT
Start: 2023-09-14 | End: 2023-12-18 | Stop reason: SDUPTHER

## 2023-09-14 RX ORDER — DEXTROAMPHETAMINE SACCHARATE, AMPHETAMINE ASPARTATE, DEXTROAMPHETAMINE SULFATE AND AMPHETAMINE SULFATE 7.5; 7.5; 7.5; 7.5 MG/1; MG/1; MG/1; MG/1
30 TABLET ORAL 2 TIMES DAILY
Qty: 60 TABLET | Refills: 0 | Status: SHIPPED | OUTPATIENT
Start: 2023-10-14 | End: 2023-12-18 | Stop reason: SDUPTHER

## 2023-09-14 RX ORDER — DEXTROAMPHETAMINE SACCHARATE, AMPHETAMINE ASPARTATE, DEXTROAMPHETAMINE SULFATE AND AMPHETAMINE SULFATE 7.5; 7.5; 7.5; 7.5 MG/1; MG/1; MG/1; MG/1
30 TABLET ORAL 2 TIMES DAILY
Qty: 60 TABLET | Refills: 0 | Status: SHIPPED | OUTPATIENT
Start: 2023-09-14 | End: 2023-12-18 | Stop reason: SDUPTHER

## 2023-09-14 NOTE — PROGRESS NOTES
Subjective:       Patient ID: Priscilla Wall is a 42 y.o. female.    Chief Complaint: Medication Refill    Priscilla Wall is a 42-year-old female patient who presents to clinic today for medication refills.  History of ADD and is currently taking Adderall 30 mg twice a day.  Needs refills.   checked.  Depression doing well on Effexor.  She has been on medication for about 4 days and has felt very emotional.  She is crying easily.  Denies thoughts of suicide or homicide.  Has had previous ORIF of the 5th metatarsal of the right foot.  She states she has been having a increase in pain in her right foot and is requesting referral to Podiatry.  Hypertension with good control on Mavik.  She does state that her insurance does not fully cover the Mavik and is asking if there is an alternative blood pressure medication.  Denies chest pain or shortness a breath.      Review of patient's allergies indicates:   Allergen Reactions    Sulfa (sulfonamide antibiotics) Anaphylaxis     Social Determinants of Health     Tobacco Use: Medium Risk (9/14/2023)    Patient History     Smoking Tobacco Use: Former     Smokeless Tobacco Use: Never     Passive Exposure: Not on file   Alcohol Use: Not on file   Financial Resource Strain: Not on file   Food Insecurity: Not on file   Transportation Needs: Not on file   Physical Activity: Not on file   Stress: Not on file   Social Connections: Not on file   Housing Stability: Not on file   Depression: Low Risk  (6/12/2023)    Depression     Last PHQ-4: Flowsheet Data: 0      Past Medical History:   Diagnosis Date    ADD (attention deficit disorder)     Depression     Foot fracture, right     Hypertension     Irregular menstrual bleeding 06/2021    MRSA infection 06/02/2017      Past Surgical History:   Procedure Laterality Date    FOOT HARDWARE REMOVAL      INCISION AND DRAINAGE FOOT Right     ORIF 5th metatarsal Right     TUBAL LIGATION Bilateral 2016      Social History     Socioeconomic  History    Marital status: Single         Current Outpatient Medications:     mupirocin (BACTROBAN) 2 % ointment, Apply topically 3 (three) times daily., Disp: 22 g, Rfl: 0    naproxen (NAPROSYN) 500 MG tablet, Take 1 tablet (500 mg total) by mouth 2 (two) times daily., Disp: 30 tablet, Rfl: 0    valACYclovir (VALTREX) 1000 MG tablet, TAKE 0.5 TABLETS (500 MG TOTAL) BY MOUTH ONCE DAILY., Disp: 45 tablet, Rfl: 1    dextroamphetamine-amphetamine 30 mg Tab, Take 1 tablet (30 mg total) by mouth 2 (two) times a day., Disp: 60 tablet, Rfl: 0    [START ON 10/14/2023] dextroamphetamine-amphetamine 30 mg Tab, Take 1 tablet (30 mg total) by mouth 2 (two) times a day., Disp: 60 tablet, Rfl: 0    [START ON 11/14/2023] dextroamphetamine-amphetamine 30 mg Tab, Take 1 tablet (30 mg total) by mouth 2 (two) times a day., Disp: 60 tablet, Rfl: 0    lisinopriL (PRINIVIL,ZESTRIL) 5 MG tablet, Take 1 tablet (5 mg total) by mouth once daily., Disp: 90 tablet, Rfl: 3    venlafaxine (EFFEXOR XR) 75 MG 24 hr capsule, Take 1 capsule (75 mg total) by mouth once daily., Disp: 90 capsule, Rfl: 1    Lab Results   Component Value Date    WBC 7.1 02/03/2022    HGB 12.7 02/03/2022    HCT 39.8 02/03/2022     02/03/2022    CHOL 182 02/03/2022    TRIG 157 (H) 02/03/2022    HDL 57 02/03/2022    ALT 10 02/03/2022    AST 12 02/03/2022     02/03/2022    K 4.6 02/03/2022     02/03/2022    CREATININE 0.80 02/03/2022    BUN 11 02/03/2022    CO2 28 02/03/2022    TSH 1.29 02/03/2022    INR 1.0 06/01/2017       Review of Systems   Constitutional:  Negative for chills and fever.   Respiratory:  Negative for chest tightness and shortness of breath.    Cardiovascular:  Negative for chest pain, palpitations and leg swelling.   Musculoskeletal:  Positive for arthralgias.   Psychiatric/Behavioral:  Negative for dysphoric mood and suicidal ideas. The patient is not nervous/anxious.        Objective:      Physical Exam  Vitals reviewed.    Constitutional:       Appearance: Normal appearance.   Cardiovascular:      Rate and Rhythm: Normal rate and regular rhythm.      Pulses: Normal pulses.           Radial pulses are 2+ on the right side and 2+ on the left side.        Posterior tibial pulses are 2+ on the right side and 2+ on the left side.      Heart sounds: Normal heart sounds, S1 normal and S2 normal.   Pulmonary:      Effort: Pulmonary effort is normal.      Breath sounds: Normal breath sounds.   Musculoskeletal:      Right lower leg: No edema.      Left lower leg: No edema.   Skin:     General: Skin is warm and dry.      Capillary Refill: Capillary refill takes less than 2 seconds.   Neurological:      Mental Status: She is alert.   Psychiatric:         Attention and Perception: Attention normal.         Mood and Affect: Mood and affect normal.         Speech: Speech normal.         Behavior: Behavior normal. Behavior is cooperative.         Thought Content: Thought content normal. Thought content does not include homicidal or suicidal ideation. Thought content does not include homicidal or suicidal plan.         Judgment: Judgment normal.         Assessment:       1. Essential hypertension    2. Attention deficit disorder (ADD) without hyperactivity    3. Current moderate episode of major depressive disorder without prior episode    4. Right foot pain    5. Swelling of right foot    6. S/P ORIF (open reduction internal fixation) fracture        Plan:       Priscilla was seen today for medication refill.    Diagnoses and all orders for this visit:    Essential hypertension  -     Comprehensive Metabolic Panel; Future  -     Comprehensive Metabolic Panel  -     lisinopriL (PRINIVIL,ZESTRIL) 5 MG tablet; Take 1 tablet (5 mg total) by mouth once daily.    Attention deficit disorder (ADD) without hyperactivity  -     dextroamphetamine-amphetamine 30 mg Tab; Take 1 tablet (30 mg total) by mouth 2 (two) times a day.  -     dextroamphetamine-amphetamine  30 mg Tab; Take 1 tablet (30 mg total) by mouth 2 (two) times a day.  -     dextroamphetamine-amphetamine 30 mg Tab; Take 1 tablet (30 mg total) by mouth 2 (two) times a day.    Current moderate episode of major depressive disorder without prior episode  -     venlafaxine (EFFEXOR XR) 75 MG 24 hr capsule; Take 1 capsule (75 mg total) by mouth once daily.    Right foot pain  -     Ambulatory referral/consult to Podiatry; Future    Swelling of right foot  -     Ambulatory referral/consult to Podiatry; Future    S/P ORIF (open reduction internal fixation) fracture  -     Ambulatory referral/consult to Podiatry; Future    Other orders  The following orders have not been finalized:  -     Cancel: trandolapriL (MAVIK) 2 MG Tab    Hypertension   - start Mavik  - start lisinopril 5 mg daily  - keep daily blood pressure log.  If unable to maintain goal of less than 130/90 patient should return to clinic for medication adjustment.    ADD  - Adderall refilled x3 months  - follow-up in 3 months for refills    Depression  - continue Effexor     Have fasting labs.  Recommend smoking cessation but patient declines.  referral to podiatry to assess increase in foot pain.  Follow-up in 3 months or sooner if needed.                 no deformity, pain or tenderness. no restriction of movement

## 2023-10-13 ENCOUNTER — TELEPHONE (OUTPATIENT)
Dept: FAMILY MEDICINE | Facility: CLINIC | Age: 43
End: 2023-10-13
Payer: MEDICAID

## 2023-10-13 NOTE — TELEPHONE ENCOUNTER
----- Message from Roxanna Anson sent at 10/13/2023 12:50 PM CDT -----  Contact: pt  Type: Needs Medical Advice         Who Called: pt  Best Call Back Number:[phone]  Additional Information: Requesting a call back regarding  pt is asking if there is a way she can go back to taking the citalopram (CELEXA) 20 MG tablet again  rather then the venlafaxine (EFFEXOR XR) 75 MG 24 hr capsule due to the way it makes her feel.     If so pt is going to need a new rx of citalopram (CELEXA) 20 MG tablet  sent to her pharm'[facility] #[id number] - [place] [address] & [address] [address]  Phone: [phone] Fax: [phone]      Please Advise- Thank you

## 2023-10-16 RX ORDER — CITALOPRAM 20 MG/1
20 TABLET, FILM COATED ORAL DAILY
Qty: 90 TABLET | Refills: 1 | Status: SHIPPED | OUTPATIENT
Start: 2023-10-16 | End: 2023-10-20 | Stop reason: SDUPTHER

## 2023-10-16 RX ORDER — CITALOPRAM 20 MG/1
20 TABLET, FILM COATED ORAL DAILY
COMMUNITY
End: 2023-10-16 | Stop reason: SDUPTHER

## 2023-10-20 RX ORDER — CITALOPRAM 20 MG/1
20 TABLET, FILM COATED ORAL DAILY
Qty: 90 TABLET | Refills: 1 | Status: SHIPPED | OUTPATIENT
Start: 2023-10-20 | End: 2023-12-18 | Stop reason: SDUPTHER

## 2023-10-20 NOTE — TELEPHONE ENCOUNTER
----- Message from Jo Ann Palacio sent at 10/20/2023 12:41 PM CDT -----  Regarding: advise  Contact: Patient  Type: Needs Medical Advice  Who Called:  patient    Symptoms (please be specific):  citalopram (CELEXA) 20 MG tablet    How long has patient had these symptoms:        Pharmacy name and phone #:      [facility] #[id number] - [place], [place]  [facility] [address] AT [facility] [address] [facility]  [address] [place] [address]  Phone: [phone] Fax: [phone]          Best Call Back Number: [phone]    Additional Information: Patient wants to start back on the following due to the other medication not being a good substitute; please call to advise thanks!

## 2023-10-20 NOTE — TELEPHONE ENCOUNTER
No care due was identified.  [facility] Embedded Care Due Messages. Reference number: [id number].   10/20/2023 1:19:16 PM CDT

## 2023-12-18 ENCOUNTER — OFFICE VISIT (OUTPATIENT)
Dept: FAMILY MEDICINE | Facility: CLINIC | Age: 43
End: 2023-12-18
Payer: MEDICAID

## 2023-12-18 VITALS
TEMPERATURE: 97 F | HEART RATE: 98 BPM | SYSTOLIC BLOOD PRESSURE: 126 MMHG | HEIGHT: 66 IN | WEIGHT: 196 LBS | OXYGEN SATURATION: 98 % | DIASTOLIC BLOOD PRESSURE: 90 MMHG | BODY MASS INDEX: 31.5 KG/M2

## 2023-12-18 DIAGNOSIS — F98.8 ATTENTION DEFICIT DISORDER (ADD) WITHOUT HYPERACTIVITY: ICD-10-CM

## 2023-12-18 DIAGNOSIS — I10 ESSENTIAL HYPERTENSION: Primary | ICD-10-CM

## 2023-12-18 DIAGNOSIS — B00.1 HERPES LABIALIS: ICD-10-CM

## 2023-12-18 DIAGNOSIS — F32.1 CURRENT MODERATE EPISODE OF MAJOR DEPRESSIVE DISORDER WITHOUT PRIOR EPISODE: ICD-10-CM

## 2023-12-18 PROCEDURE — 1160F RVW MEDS BY RX/DR IN RCRD: CPT | Mod: CPTII,S$GLB,,

## 2023-12-18 PROCEDURE — 3008F PR BODY MASS INDEX (BMI) DOCUMENTED: ICD-10-PCS | Mod: CPTII,S$GLB,,

## 2023-12-18 PROCEDURE — 1159F MED LIST DOCD IN RCRD: CPT | Mod: CPTII,S$GLB,,

## 2023-12-18 PROCEDURE — 3008F BODY MASS INDEX DOCD: CPT | Mod: CPTII,S$GLB,,

## 2023-12-18 PROCEDURE — 3074F PR MOST RECENT SYSTOLIC BLOOD PRESSURE < 130 MM HG: ICD-10-PCS | Mod: CPTII,S$GLB,,

## 2023-12-18 PROCEDURE — 3080F DIAST BP >= 90 MM HG: CPT | Mod: CPTII,S$GLB,,

## 2023-12-18 PROCEDURE — 4010F ACE/ARB THERAPY RXD/TAKEN: CPT | Mod: CPTII,S$GLB,,

## 2023-12-18 PROCEDURE — 1159F PR MEDICATION LIST DOCUMENTED IN MEDICAL RECORD: ICD-10-PCS | Mod: CPTII,S$GLB,,

## 2023-12-18 PROCEDURE — 99214 PR OFFICE/OUTPT VISIT, EST, LEVL IV, 30-39 MIN: ICD-10-PCS | Mod: S$GLB,,,

## 2023-12-18 PROCEDURE — 1160F PR REVIEW ALL MEDS BY PRESCRIBER/CLIN PHARMACIST DOCUMENTED: ICD-10-PCS | Mod: CPTII,S$GLB,,

## 2023-12-18 PROCEDURE — 3080F PR MOST RECENT DIASTOLIC BLOOD PRESSURE >= 90 MM HG: ICD-10-PCS | Mod: CPTII,S$GLB,,

## 2023-12-18 PROCEDURE — 3074F SYST BP LT 130 MM HG: CPT | Mod: CPTII,S$GLB,,

## 2023-12-18 PROCEDURE — 4010F PR ACE/ARB THEARPY RXD/TAKEN: ICD-10-PCS | Mod: CPTII,S$GLB,,

## 2023-12-18 PROCEDURE — 99214 OFFICE O/P EST MOD 30 MIN: CPT | Mod: S$GLB,,,

## 2023-12-18 RX ORDER — VALACYCLOVIR HYDROCHLORIDE 1 G/1
TABLET, FILM COATED ORAL
Qty: 45 TABLET | Refills: 1 | Status: SHIPPED | OUTPATIENT
Start: 2023-12-18

## 2023-12-18 RX ORDER — MUPIROCIN 20 MG/G
OINTMENT TOPICAL 3 TIMES DAILY
Qty: 22 G | Refills: 0 | Status: CANCELLED | OUTPATIENT
Start: 2023-12-18

## 2023-12-18 RX ORDER — DEXTROAMPHETAMINE SACCHARATE, AMPHETAMINE ASPARTATE, DEXTROAMPHETAMINE SULFATE AND AMPHETAMINE SULFATE 7.5; 7.5; 7.5; 7.5 MG/1; MG/1; MG/1; MG/1
30 TABLET ORAL 2 TIMES DAILY
Qty: 60 TABLET | Refills: 0 | Status: SHIPPED | OUTPATIENT
Start: 2024-02-16 | End: 2024-03-21 | Stop reason: SDUPTHER

## 2023-12-18 RX ORDER — CITALOPRAM 20 MG/1
20 TABLET, FILM COATED ORAL DAILY
Qty: 90 TABLET | Refills: 1 | Status: SHIPPED | OUTPATIENT
Start: 2023-12-18 | End: 2024-03-21 | Stop reason: SDUPTHER

## 2023-12-18 RX ORDER — DEXTROAMPHETAMINE SACCHARATE, AMPHETAMINE ASPARTATE, DEXTROAMPHETAMINE SULFATE AND AMPHETAMINE SULFATE 7.5; 7.5; 7.5; 7.5 MG/1; MG/1; MG/1; MG/1
30 TABLET ORAL 2 TIMES DAILY
Qty: 60 TABLET | Refills: 0 | Status: SHIPPED | OUTPATIENT
Start: 2024-01-18 | End: 2024-03-21 | Stop reason: SDUPTHER

## 2023-12-18 RX ORDER — DEXTROAMPHETAMINE SACCHARATE, AMPHETAMINE ASPARTATE, DEXTROAMPHETAMINE SULFATE AND AMPHETAMINE SULFATE 7.5; 7.5; 7.5; 7.5 MG/1; MG/1; MG/1; MG/1
30 TABLET ORAL 2 TIMES DAILY
Qty: 60 TABLET | Refills: 0 | Status: SHIPPED | OUTPATIENT
Start: 2023-12-18 | End: 2024-03-21 | Stop reason: SDUPTHER

## 2023-12-18 RX ORDER — LISINOPRIL 5 MG/1
5 TABLET ORAL DAILY
Qty: 90 TABLET | Refills: 3 | Status: SHIPPED | OUTPATIENT
Start: 2023-12-18 | End: 2024-12-17

## 2023-12-18 NOTE — PROGRESS NOTES
Subjective:       Patient ID: Priscilla Wall is a 43 y.o. female.    Chief Complaint: Follow-up    Priscilla Wall is a 43-year-old female patient who presents to clinic today for medication refills.  She has a history of ADD and is currently taking Adderall 30 mg twice daily.  She is doing well on this dose.  Depression.  Last visit she was switched from Celexa to Effexor but she felt like this was not helping her as well so she was changed back to Celexa and is doing okay.  Denies thoughts of suicide or homicide.  History of hypertension with an elevated blood pressure today of 126/90.  Patient states she has not taken her medication in a couple of days.  She is currently on lisinopril 5 mg daily.  She denies chest pain, palpitations, or shortness a breath.  She gets occasional fever blisters and takes Valtrex at the onset and is requesting refills.         Review of patient's allergies indicates:   Allergen Reactions    Sulfa (sulfonamide antibiotics) Anaphylaxis     Social Determinants of Health     Tobacco Use: Medium Risk (12/18/2023)    Patient History     Smoking Tobacco Use: Former     Smokeless Tobacco Use: Never     Passive Exposure: Not on file   Alcohol Use: Not on file   Financial Resource Strain: Not on file   Food Insecurity: Not on file   Transportation Needs: Not on file   Physical Activity: Not on file   Stress: Not on file   Social Connections: Not on file   Housing Stability: Not on file   Depression: Low Risk  (12/18/2023)    Depression     Last PHQ-4: Flowsheet Data: 0      Past Medical History:   Diagnosis Date    ADD (attention deficit disorder)     Depression     Foot fracture, right     Hypertension     Irregular menstrual bleeding 06/2021    MRSA infection 06/02/2017      Past Surgical History:   Procedure Laterality Date    FOOT HARDWARE REMOVAL      INCISION AND DRAINAGE FOOT Right     ORIF 5th metatarsal Right     TUBAL LIGATION Bilateral 2016      Social History      Socioeconomic History    Marital status: Single         Current Outpatient Medications:     mupirocin (BACTROBAN) 2 % ointment, Apply topically 3 (three) times daily., Disp: 22 g, Rfl: 0    naproxen (NAPROSYN) 500 MG tablet, Take 1 tablet (500 mg total) by mouth 2 (two) times daily., Disp: 30 tablet, Rfl: 0    citalopram (CELEXA) 20 MG tablet, Take 1 tablet (20 mg total) by mouth once daily., Disp: 90 tablet, Rfl: 1    dextroamphetamine-amphetamine 30 mg Tab, Take 1 tablet (30 mg total) by mouth 2 (two) times a day., Disp: 60 tablet, Rfl: 0    [START ON 1/18/2024] dextroamphetamine-amphetamine 30 mg Tab, Take 1 tablet (30 mg total) by mouth 2 (two) times a day., Disp: 60 tablet, Rfl: 0    [START ON 2/16/2024] dextroamphetamine-amphetamine 30 mg Tab, Take 1 tablet (30 mg total) by mouth 2 (two) times a day., Disp: 60 tablet, Rfl: 0    lisinopriL (PRINIVIL,ZESTRIL) 5 MG tablet, Take 1 tablet (5 mg total) by mouth once daily., Disp: 90 tablet, Rfl: 3    valACYclovir (VALTREX) 1000 MG tablet, TAKE 0.5 TABLETS (500 MG TOTAL) BY MOUTH ONCE DAILY., Disp: 45 tablet, Rfl: 1    Lab Results   Component Value Date    WBC 7.1 02/03/2022    HGB 12.7 02/03/2022    HCT 39.8 02/03/2022     02/03/2022    CHOL 182 02/03/2022    TRIG 157 (H) 02/03/2022    HDL 57 02/03/2022    ALT 10 02/03/2022    AST 12 02/03/2022     02/03/2022    K 4.6 02/03/2022     02/03/2022    CREATININE 0.80 02/03/2022    BUN 11 02/03/2022    CO2 28 02/03/2022    TSH 1.29 02/03/2022    INR 1.0 06/01/2017       Review of Systems   Constitutional:  Negative for chills and fever.   Respiratory:  Negative for chest tightness and shortness of breath.    Cardiovascular:  Positive for leg swelling. Negative for chest pain and palpitations.        Occasional swelling in bilateral lower extremities after standing for long periods of time, does resolve with elevation.   Psychiatric/Behavioral:  Negative for dysphoric mood and suicidal ideas. The  patient is not nervous/anxious.        Objective:      Physical Exam  Vitals reviewed.   Constitutional:       Appearance: Normal appearance.   HENT:      Head: Normocephalic and atraumatic.      Right Ear: Tympanic membrane normal.      Left Ear: Tympanic membrane normal.      Nose: Nose normal.      Mouth/Throat:      Pharynx: Oropharynx is clear.   Eyes:      Conjunctiva/sclera: Conjunctivae normal.   Neck:      Thyroid: No thyromegaly.   Cardiovascular:      Rate and Rhythm: Normal rate and regular rhythm.      Pulses: Normal pulses.           Radial pulses are 2+ on the right side and 2+ on the left side.        Posterior tibial pulses are 2+ on the right side and 2+ on the left side.      Heart sounds: Normal heart sounds, S1 normal and S2 normal.   Pulmonary:      Effort: Pulmonary effort is normal.      Breath sounds: Normal breath sounds.   Musculoskeletal:      Right lower leg: No edema.      Left lower leg: No edema.   Lymphadenopathy:      Cervical: No cervical adenopathy.   Skin:     General: Skin is warm and dry.      Capillary Refill: Capillary refill takes less than 2 seconds.   Neurological:      Mental Status: She is alert.   Psychiatric:         Attention and Perception: Attention normal.         Mood and Affect: Mood and affect normal.         Speech: Speech normal.         Behavior: Behavior normal. Behavior is cooperative.         Thought Content: Thought content normal. Thought content does not include homicidal or suicidal ideation. Thought content does not include homicidal or suicidal plan.         Judgment: Judgment normal.         Assessment:       1. Essential hypertension    2. Current moderate episode of major depressive disorder without prior episode    3. Attention deficit disorder (ADD) without hyperactivity    4. Herpes labialis        Plan:       Priscilla was seen today for follow-up.    Diagnoses and all orders for this visit:    Essential hypertension  -     lisinopriL  (PRINIVIL,ZESTRIL) 5 MG tablet; Take 1 tablet (5 mg total) by mouth once daily.    Current moderate episode of major depressive disorder without prior episode  -     citalopram (CELEXA) 20 MG tablet; Take 1 tablet (20 mg total) by mouth once daily.    Attention deficit disorder (ADD) without hyperactivity  -     dextroamphetamine-amphetamine 30 mg Tab; Take 1 tablet (30 mg total) by mouth 2 (two) times a day.  -     dextroamphetamine-amphetamine 30 mg Tab; Take 1 tablet (30 mg total) by mouth 2 (two) times a day.  -     dextroamphetamine-amphetamine 30 mg Tab; Take 1 tablet (30 mg total) by mouth 2 (two) times a day.    Herpes labialis  -     valACYclovir (VALTREX) 1000 MG tablet; TAKE 0.5 TABLETS (500 MG TOTAL) BY MOUTH ONCE DAILY.    Other orders  The following orders have not been finalized:  -     Cancel: mupirocin (BACTROBAN) 2 % ointment    Hypertension   - continue lisinopril 5 mg  - stressed medication compliance for good blood pressure control    Depression  - continue celexa 20 mg daily    ADD   - Adderall refilled x3.   checked    Herpes labialis  - valtrex at onset    Follow up in 3 months.

## 2023-12-20 PROBLEM — B00.1 HERPES LABIALIS: Status: ACTIVE | Noted: 2023-12-20

## 2024-01-03 DIAGNOSIS — F98.8 ATTENTION DEFICIT DISORDER (ADD) WITHOUT HYPERACTIVITY: ICD-10-CM

## 2024-01-03 NOTE — TELEPHONE ENCOUNTER
----- Message from Paul Sheriff sent at 1/3/2024  4:07 PM CST -----  Contact: self  Type: Needs Medical Advice  Who Called:  Patient    Pharmacy name and phone #:        [facility][id number] - [place] [address] [address] [facility] [address]  [address] [place] [address]  Phone: [phone] Fax: [phone]      Best Call Back Number: [phone]   Additional Information: Called to speak with office. States phcy out of stock with dextroamphetamine-amphetamine 30 mg Tab, suggested by [facility] to call in 20mg TiD

## 2024-01-04 RX ORDER — DEXTROAMPHETAMINE SACCHARATE, AMPHETAMINE ASPARTATE MONOHYDRATE, DEXTROAMPHETAMINE SULFATE AND AMPHETAMINE SULFATE 5; 5; 5; 5 MG/1; MG/1; MG/1; MG/1
20 CAPSULE, EXTENDED RELEASE ORAL 2 TIMES DAILY
Qty: 60 CAPSULE | Refills: 0 | OUTPATIENT
Start: 2024-01-04

## 2024-01-05 RX ORDER — DEXTROAMPHETAMINE SACCHARATE, AMPHETAMINE ASPARTATE, DEXTROAMPHETAMINE SULFATE AND AMPHETAMINE SULFATE 5; 5; 5; 5 MG/1; MG/1; MG/1; MG/1
1 TABLET ORAL 3 TIMES DAILY
Qty: 90 TABLET | Refills: 0 | Status: SHIPPED | OUTPATIENT
Start: 2024-01-05 | End: 2024-01-31 | Stop reason: SDUPTHER

## 2024-01-05 RX ORDER — DEXTROAMPHETAMINE SACCHARATE, AMPHETAMINE ASPARTATE, DEXTROAMPHETAMINE SULFATE AND AMPHETAMINE SULFATE 5; 5; 5; 5 MG/1; MG/1; MG/1; MG/1
1 TABLET ORAL DAILY
COMMUNITY
End: 2024-01-05 | Stop reason: SDUPTHER

## 2024-01-05 NOTE — TELEPHONE ENCOUNTER
Pharm has been out of adderall 30 mg bid . Pt did not get 1 st one due to that on 12/1823. Sending 20 mg 1 po tid to jersey to ok pt request.

## 2024-01-17 ENCOUNTER — PATIENT MESSAGE (OUTPATIENT)
Dept: ADMINISTRATIVE | Facility: HOSPITAL | Age: 44
End: 2024-01-17
Payer: MEDICAID

## 2024-01-31 ENCOUNTER — TELEPHONE (OUTPATIENT)
Dept: FAMILY MEDICINE | Facility: CLINIC | Age: 44
End: 2024-01-31
Payer: MEDICAID

## 2024-01-31 RX ORDER — DEXTROAMPHETAMINE SACCHARATE, AMPHETAMINE ASPARTATE, DEXTROAMPHETAMINE SULFATE AND AMPHETAMINE SULFATE 5; 5; 5; 5 MG/1; MG/1; MG/1; MG/1
1 TABLET ORAL 3 TIMES DAILY
Qty: 90 TABLET | Refills: 0 | Status: SHIPPED | OUTPATIENT
Start: 2024-01-31

## 2024-03-11 ENCOUNTER — TELEPHONE (OUTPATIENT)
Dept: FAMILY MEDICINE | Facility: CLINIC | Age: 44
End: 2024-03-11
Payer: MEDICAID

## 2024-03-11 NOTE — TELEPHONE ENCOUNTER
----- Message from Jo Ann Palacio sent at 3/11/2024  1:28 PM CDT -----  Regarding: advise  Contact: pt  Type: Needs Medical Advice  Who Called:  patient  Symptoms (please be specific):    How long has patient had these symptoms:   Pharmacy name and phone #:    Best Call Back Number: [phone]    Additional Information: would like to change apt to virtual call to advise

## 2024-03-12 NOTE — TELEPHONE ENCOUNTER
Left vm depends on what the appointment is for I can check with nurse jersey to see if virtual is appropriate

## 2024-03-13 ENCOUNTER — TELEPHONE (OUTPATIENT)
Dept: FAMILY MEDICINE | Facility: CLINIC | Age: 44
End: 2024-03-13
Payer: MEDICAID

## 2024-03-13 DIAGNOSIS — Z13.1 SCREENING FOR DIABETES MELLITUS (DM): ICD-10-CM

## 2024-03-13 DIAGNOSIS — R79.9 ABNORMAL BLOOD CHEMISTRY: ICD-10-CM

## 2024-03-13 DIAGNOSIS — R53.83 FATIGUE, UNSPECIFIED TYPE: ICD-10-CM

## 2024-03-13 DIAGNOSIS — I10 ESSENTIAL HYPERTENSION: Primary | ICD-10-CM

## 2024-03-13 DIAGNOSIS — E78.5 HYPERLIPIDEMIA, UNSPECIFIED HYPERLIPIDEMIA TYPE: ICD-10-CM

## 2024-03-18 ENCOUNTER — TELEPHONE (OUTPATIENT)
Dept: FAMILY MEDICINE | Facility: CLINIC | Age: 44
End: 2024-03-18

## 2024-03-18 NOTE — TELEPHONE ENCOUNTER
----- Message from Jey Whelan, Patient Care Assistant sent at 3/18/2024  1:39 PM CDT -----  Contact: Pt  Type: Needs Medical Advice    Who Called: Pt  Best Call Back Number: [phone]  Inquiry/Question: Pt is calling to see if she can have her appt changed to a VV. Please advise Thank you~

## 2024-03-21 ENCOUNTER — OFFICE VISIT (OUTPATIENT)
Dept: FAMILY MEDICINE | Facility: CLINIC | Age: 44
End: 2024-03-21
Payer: MEDICAID

## 2024-03-21 VITALS
SYSTOLIC BLOOD PRESSURE: 122 MMHG | OXYGEN SATURATION: 99 % | BODY MASS INDEX: 30.25 KG/M2 | TEMPERATURE: 99 F | DIASTOLIC BLOOD PRESSURE: 82 MMHG | HEART RATE: 98 BPM | WEIGHT: 187.38 LBS

## 2024-03-21 DIAGNOSIS — F98.8 ATTENTION DEFICIT DISORDER, UNSPECIFIED HYPERACTIVITY PRESENCE: Primary | ICD-10-CM

## 2024-03-21 DIAGNOSIS — I10 HYPERTENSION, ESSENTIAL: ICD-10-CM

## 2024-03-21 DIAGNOSIS — R60.0 PEDAL EDEMA: ICD-10-CM

## 2024-03-21 DIAGNOSIS — F32.1 CURRENT MODERATE EPISODE OF MAJOR DEPRESSIVE DISORDER WITHOUT PRIOR EPISODE: ICD-10-CM

## 2024-03-21 DIAGNOSIS — F98.8 ATTENTION DEFICIT DISORDER (ADD) WITHOUT HYPERACTIVITY: ICD-10-CM

## 2024-03-21 PROCEDURE — 99999 PR PBB SHADOW E&M-EST. PATIENT-LVL III: CPT | Mod: PBBFAC,,, | Performed by: FAMILY MEDICINE

## 2024-03-21 PROCEDURE — 3008F BODY MASS INDEX DOCD: CPT | Mod: CPTII,,, | Performed by: FAMILY MEDICINE

## 2024-03-21 PROCEDURE — 1160F RVW MEDS BY RX/DR IN RCRD: CPT | Mod: CPTII,,, | Performed by: FAMILY MEDICINE

## 2024-03-21 PROCEDURE — 4010F ACE/ARB THERAPY RXD/TAKEN: CPT | Mod: CPTII,,, | Performed by: FAMILY MEDICINE

## 2024-03-21 PROCEDURE — 3074F SYST BP LT 130 MM HG: CPT | Mod: CPTII,,, | Performed by: FAMILY MEDICINE

## 2024-03-21 PROCEDURE — 99214 OFFICE O/P EST MOD 30 MIN: CPT | Mod: S$PBB,,, | Performed by: FAMILY MEDICINE

## 2024-03-21 PROCEDURE — 99213 OFFICE O/P EST LOW 20 MIN: CPT | Mod: PBBFAC,PN | Performed by: FAMILY MEDICINE

## 2024-03-21 PROCEDURE — 3079F DIAST BP 80-89 MM HG: CPT | Mod: CPTII,,, | Performed by: FAMILY MEDICINE

## 2024-03-21 PROCEDURE — 1159F MED LIST DOCD IN RCRD: CPT | Mod: CPTII,,, | Performed by: FAMILY MEDICINE

## 2024-03-21 RX ORDER — CITALOPRAM 20 MG/1
20 TABLET, FILM COATED ORAL DAILY
Qty: 90 TABLET | Refills: 1 | Status: SHIPPED | OUTPATIENT
Start: 2024-03-21

## 2024-03-21 RX ORDER — DEXTROAMPHETAMINE SACCHARATE, AMPHETAMINE ASPARTATE, DEXTROAMPHETAMINE SULFATE AND AMPHETAMINE SULFATE 7.5; 7.5; 7.5; 7.5 MG/1; MG/1; MG/1; MG/1
30 TABLET ORAL 2 TIMES DAILY
Qty: 60 TABLET | Refills: 0 | Status: SHIPPED | OUTPATIENT
Start: 2024-04-20

## 2024-03-21 RX ORDER — DEXTROAMPHETAMINE SACCHARATE, AMPHETAMINE ASPARTATE, DEXTROAMPHETAMINE SULFATE AND AMPHETAMINE SULFATE 7.5; 7.5; 7.5; 7.5 MG/1; MG/1; MG/1; MG/1
30 TABLET ORAL 2 TIMES DAILY
Qty: 60 TABLET | Refills: 0 | Status: SHIPPED | OUTPATIENT
Start: 2024-03-21

## 2024-03-21 RX ORDER — DEXTROAMPHETAMINE SACCHARATE, AMPHETAMINE ASPARTATE, DEXTROAMPHETAMINE SULFATE AND AMPHETAMINE SULFATE 7.5; 7.5; 7.5; 7.5 MG/1; MG/1; MG/1; MG/1
30 TABLET ORAL 2 TIMES DAILY
Qty: 60 TABLET | Refills: 0 | Status: SHIPPED | OUTPATIENT
Start: 2024-05-20

## 2024-03-23 NOTE — PROGRESS NOTES
Subjective:       Patient ID: Priscilla Wall is a 43 y.o. female.    Chief Complaint: Leg Swelling (Both legs)    Hypertension controlled.  No chest pain no palpitations.  Attention deficit disorder.  Adderall 30 b.i.d. needs refills doing well on this.  No chest pain no palpitations.  Still vaping.  Depression is doing okay.  Using her Celexa.  Some pedal edema often on using compression stockings.  Some edema at the end of the day.  Has not done her labs yet.  No PND orthopnea.      Physical examination.  Vital signs noted.  No acute distress.  Neck without adenopathy no bruit.  Chest clear no wheezes or crackles.  Heart regular rate and rhythm.  Extremities without edema positive pedal pulses.  Abdomen soft nontender.        Objective:        Assessment:       1. Attention deficit disorder, unspecified hyperactivity presence    2. Current moderate episode of major depressive disorder without prior episode    3. Hypertension, essential    4. Pedal edema    5. Attention deficit disorder (ADD) without hyperactivity        Plan:       Attention deficit disorder, unspecified hyperactivity presence    Current moderate episode of major depressive disorder without prior episode    Hypertension, essential    Pedal edema  -     BNP; Future; Expected date: 03/21/2024    Attention deficit disorder (ADD) without hyperactivity  -     dextroamphetamine-amphetamine 30 mg Tab; Take 1 tablet (30 mg total) by mouth 2 (two) times a day.  Dispense: 60 tablet; Refill: 0  -     dextroamphetamine-amphetamine 30 mg Tab; Take 1 tablet (30 mg total) by mouth 2 (two) times a day.  Dispense: 60 tablet; Refill: 0  -     dextroamphetamine-amphetamine 30 mg Tab; Take 1 tablet (30 mg total) by mouth 2 (two) times a day.  Dispense: 60 tablet; Refill: 0    Other orders  -     citalopram (CELEXA) 20 MG tablet; Take 1 tablet (20 mg total) by mouth once daily.  Dispense: 90 tablet; Refill: 1    Refill Adderall 30 mg b.i.d. 3 prescriptions for 60  each.  Needs to go for mammogram.  Refill Celexa 20 mg 90 day supply with a refill.  Do labs as ordered.  Third time we have requested these.  Will add a BNP.

## 2024-04-18 ENCOUNTER — OFFICE VISIT (OUTPATIENT)
Dept: URGENT CARE | Facility: CLINIC | Age: 44
End: 2024-04-18
Payer: MEDICAID

## 2024-04-18 VITALS
RESPIRATION RATE: 16 BRPM | DIASTOLIC BLOOD PRESSURE: 87 MMHG | WEIGHT: 187 LBS | TEMPERATURE: 98 F | HEIGHT: 66 IN | BODY MASS INDEX: 30.05 KG/M2 | HEART RATE: 84 BPM | OXYGEN SATURATION: 96 % | SYSTOLIC BLOOD PRESSURE: 123 MMHG

## 2024-04-18 DIAGNOSIS — S46.811A STRAIN OF RIGHT TRAPEZIUS MUSCLE, INITIAL ENCOUNTER: Primary | ICD-10-CM

## 2024-04-18 PROCEDURE — 99203 OFFICE O/P NEW LOW 30 MIN: CPT | Mod: S$GLB,,, | Performed by: NURSE PRACTITIONER

## 2024-04-18 RX ORDER — METHOCARBAMOL 500 MG/1
500 TABLET, FILM COATED ORAL 3 TIMES DAILY PRN
Qty: 21 TABLET | Refills: 0 | Status: SHIPPED | OUTPATIENT
Start: 2024-04-18

## 2024-04-18 RX ORDER — DICLOFENAC SODIUM 10 MG/G
2 GEL TOPICAL 3 TIMES DAILY PRN
Qty: 1 EACH | Refills: 0 | Status: SHIPPED | OUTPATIENT
Start: 2024-04-18

## 2024-04-18 RX ORDER — NAPROXEN 500 MG/1
500 TABLET ORAL 2 TIMES DAILY PRN
Qty: 30 TABLET | Refills: 0 | Status: SHIPPED | OUTPATIENT
Start: 2024-04-18

## 2024-04-18 NOTE — PATIENT INSTRUCTIONS
-Robaxin as prescribed. Do not drink alcohol, drive, work, or operate heavy machinery while taking this medication, it may cause drowsiness.       INSTRUCTIONS:  - Rest.  - Drink plenty of fluids.  - Take Tylenol and/or Ibuprofen as directed as needed for fever/pain.  Do not take more than the recommended dose.  - follow up with your PCP within the next 1-2 weeks as needed.  - You must understand that you have received an Urgent Care treatment only and that you may be released before all of your medical problems are known or treated.   - You, the patient, will arrange for follow up care as instructed.   - If your condition worsens or fails to improve we recommend that you receive another evaluation at the ER immediately or contact your PCP to discuss your concerns.   - You can call [phone] or [phone] to help schedule an appointment with the appropriate provider.     -If you smoke cigarettes, it would be beneficial for you to stop.

## 2024-04-18 NOTE — PROGRESS NOTES
"Subjective:      Patient ID: Priscilla Wall is a 43 y.o. female.    Vitals:  height is 5' 6" (1.676 m) and weight is 84.8 kg (187 lb). Her oral temperature is 98.4 °F (36.9 °C). Her blood pressure is 123/87 and her pulse is 84. Her respiration is 16 and oxygen saturation is 96%.     Chief Complaint: Shoulder Pain    Pt presents today in  with right shoulder pain,onset 2 days ago, pt states that she took alive about 1 hr ago,pt states that when she lifts her arm she feels pulling,  7/10 pain level.    Provider note begins below:  Patient denies any injury. She works as a . She took Aleve earlier PTA that helped some.      Shoulder Pain   The pain is present in the right shoulder. This is a new problem. The current episode started in the past 7 days. There has been no history of extremity trauma. The problem occurs constantly. The problem has been gradually worsening. The quality of the pain is described as pounding. The pain is at a severity of 7/10. The pain is moderate. Associated symptoms include headaches, an inability to bear weight, joint locking, joint swelling and stiffness. Pertinent negatives include no fever or itching. The symptoms are aggravated by activity, cold, contact, heat, lying down and standing. She has tried OTC pain meds for the symptoms. The treatment provided no relief. Family history does not include arthritis. There is no history of diabetes, Injuries to Extremity or migraines.       Constitution: Negative. Negative for chills, sweating, fatigue and fever.   HENT: Negative.  Negative for ear pain, facial swelling, congestion and sore throat.    Neck: Negative for painful lymph nodes.   Cardiovascular: Negative.  Negative for chest trauma, chest pain and sob on exertion.   Eyes: Negative.  Negative for eye itching and eye pain.   Respiratory: Negative.  Negative for chest tightness, cough and asthma.    Gastrointestinal: Negative.  Negative for nausea, vomiting and diarrhea. "   Endocrine: negative. cold intolerance and excessive thirst.   Genitourinary: Negative.  Negative for dysuria, frequency, urgency and hematuria.   Musculoskeletal:  Negative for pain, trauma and joint pain.   Skin: Negative.  Negative for rash, wound and hives.   Allergic/Immunologic: Negative.  Negative for eczema, asthma, hives and itching.   Neurological:  Positive for headaches. Negative for disorientation and altered mental status.   Hematologic/Lymphatic: Negative.  Negative for swollen lymph nodes.   Psychiatric/Behavioral: Negative.  Negative for altered mental status, disorientation and confusion.       Objective:     Physical Exam   Constitutional: She is oriented to person, place, and time. She appears well-developed. She is cooperative.  Non-toxic appearance. She does not appear ill. No distress.   HENT:   Head: Normocephalic and atraumatic.   Ears:   Right Ear: Hearing normal.   Left Ear: Hearing normal.   Nose: Nose normal. No mucosal edema or nasal deformity. No epistaxis. Right sinus exhibits no maxillary sinus tenderness and no frontal sinus tenderness. Left sinus exhibits no maxillary sinus tenderness and no frontal sinus tenderness.   Mouth/Throat: Uvula is midline, oropharynx is clear and moist and mucous membranes are normal. No trismus in the jaw. Normal dentition. No uvula swelling.   Eyes: Conjunctivae and lids are normal.   Neck: Trachea normal and phonation normal. Neck supple.   Cardiovascular: Normal rate, regular rhythm, normal heart sounds and normal pulses.   Pulmonary/Chest: Effort normal and breath sounds normal. No stridor. No respiratory distress. She has no wheezes. She has no rhonchi. She has no rales. She exhibits no tenderness.   Abdominal: Normal appearance and bowel sounds are normal. Soft.   Musculoskeletal: Normal range of motion.         General: Tenderness present. No swelling, deformity or signs of injury. Normal range of motion.      Right shoulder: She exhibits  tenderness. She exhibits normal range of motion.        Arms:    Neurological: no focal deficit. She is alert, oriented to person, place, and time and at baseline. She exhibits normal muscle tone.   Skin: Skin is warm, dry, intact and not diaphoretic.   Psychiatric: Her speech is normal and behavior is normal. Mood, judgment and thought content normal.   Nursing note and vitals reviewed.      Assessment:     1. Strain of right trapezius muscle, initial encounter        Plan:   FOLLOWUP  Follow up if symptoms worsen or fail to improve, for PLEASE CONTACT PCP OR CONTACT THE EMERGENCY ROOM..     PATIENT INSTRUCTIONS  Patient Instructions   -Robaxin as prescribed. Do not drink alcohol, drive, work, or operate heavy machinery while taking this medication, it may cause drowsiness.       INSTRUCTIONS:  - Rest.  - Drink plenty of fluids.  - Take Tylenol and/or Ibuprofen as directed as needed for fever/pain.  Do not take more than the recommended dose.  - follow up with your PCP within the next 1-2 weeks as needed.  - You must understand that you have received an Urgent Care treatment only and that you may be released before all of your medical problems are known or treated.   - You, the patient, will arrange for follow up care as instructed.   - If your condition worsens or fails to improve we recommend that you receive another evaluation at the ER immediately or contact your PCP to discuss your concerns.   - You can call [phone] or [phone] to help schedule an appointment with the appropriate provider.     -If you smoke cigarettes, it would be beneficial for you to stop.         THANK YOU FOR ALLOWING ME TO PARTICIPATE IN YOUR HEALTHCARE,     Hari Lyons NP     Strain of right trapezius muscle, initial encounter  -     naproxen (NAPROSYN) 500 MG tablet; Take 1 tablet (500 mg total) by mouth 2 (two) times daily as needed (pain).  Dispense: 30 tablet; Refill: 0  -     methocarbamoL (ROBAXIN) 500 MG Tab;  Take 1 tablet (500 mg total) by mouth 3 (three) times daily as needed (muscle spasms).  Dispense: 21 tablet; Refill: 0  -     diclofenac sodium (VOLTAREN) 1 % Gel; Apply 2 g topically 3 (three) times daily as needed (pain).  Dispense: 1 each; Refill: 0

## 2024-06-25 ENCOUNTER — OFFICE VISIT (OUTPATIENT)
Dept: FAMILY MEDICINE | Facility: CLINIC | Age: 44
End: 2024-06-25
Payer: MEDICAID

## 2024-06-25 ENCOUNTER — HOSPITAL ENCOUNTER (OUTPATIENT)
Dept: RADIOLOGY | Facility: HOSPITAL | Age: 44
Discharge: HOME | End: 2024-06-25

## 2024-06-25 VITALS
TEMPERATURE: 98 F | WEIGHT: 191 LBS | HEART RATE: 84 BPM | BODY MASS INDEX: 30.83 KG/M2 | SYSTOLIC BLOOD PRESSURE: 128 MMHG | OXYGEN SATURATION: 98 % | DIASTOLIC BLOOD PRESSURE: 72 MMHG

## 2024-06-25 DIAGNOSIS — E78.5 HYPERLIPIDEMIA, UNSPECIFIED HYPERLIPIDEMIA TYPE: ICD-10-CM

## 2024-06-25 DIAGNOSIS — F98.8 ATTENTION DEFICIT DISORDER (ADD) WITHOUT HYPERACTIVITY: ICD-10-CM

## 2024-06-25 DIAGNOSIS — Z12.31 ENCOUNTER FOR SCREENING MAMMOGRAM FOR MALIGNANT NEOPLASM OF BREAST: ICD-10-CM

## 2024-06-25 DIAGNOSIS — M25.511 ACUTE PAIN OF RIGHT SHOULDER: ICD-10-CM

## 2024-06-25 DIAGNOSIS — I10 HYPERTENSION, ESSENTIAL: Primary | ICD-10-CM

## 2024-06-25 DIAGNOSIS — D50.0 IRON DEFICIENCY ANEMIA DUE TO CHRONIC BLOOD LOSS: ICD-10-CM

## 2024-06-25 DIAGNOSIS — S46.811A STRAIN OF RIGHT TRAPEZIUS MUSCLE, INITIAL ENCOUNTER: ICD-10-CM

## 2024-06-25 DIAGNOSIS — F32.1 CURRENT MODERATE EPISODE OF MAJOR DEPRESSIVE DISORDER WITHOUT PRIOR EPISODE: ICD-10-CM

## 2024-06-25 DIAGNOSIS — N92.6 IRREGULAR MENSTRUATION: ICD-10-CM

## 2024-06-25 PROCEDURE — 99213 OFFICE O/P EST LOW 20 MIN: CPT | Mod: PBBFAC,PN

## 2024-06-25 PROCEDURE — 99214 OFFICE O/P EST MOD 30 MIN: CPT | Mod: S$PBB,,,

## 2024-06-25 PROCEDURE — 3078F DIAST BP <80 MM HG: CPT | Mod: CPTII,,,

## 2024-06-25 PROCEDURE — 1159F MED LIST DOCD IN RCRD: CPT | Mod: CPTII,,,

## 2024-06-25 PROCEDURE — 1160F RVW MEDS BY RX/DR IN RCRD: CPT | Mod: CPTII,,,

## 2024-06-25 PROCEDURE — 4010F ACE/ARB THERAPY RXD/TAKEN: CPT | Mod: CPTII,,,

## 2024-06-25 PROCEDURE — 73030 X-RAY EXAM OF SHOULDER: CPT | Mod: 26,RT,, | Performed by: RADIOLOGY

## 2024-06-25 PROCEDURE — 3074F SYST BP LT 130 MM HG: CPT | Mod: CPTII,,,

## 2024-06-25 PROCEDURE — 73030 X-RAY EXAM OF SHOULDER: CPT | Mod: TC,RT

## 2024-06-25 PROCEDURE — 3008F BODY MASS INDEX DOCD: CPT | Mod: CPTII,,,

## 2024-06-25 PROCEDURE — 99999 PR PBB SHADOW E&M-EST. PATIENT-LVL III: CPT | Mod: PBBFAC,,,

## 2024-06-25 RX ORDER — DEXTROAMPHETAMINE SACCHARATE, AMPHETAMINE ASPARTATE, DEXTROAMPHETAMINE SULFATE AND AMPHETAMINE SULFATE 7.5; 7.5; 7.5; 7.5 MG/1; MG/1; MG/1; MG/1
30 TABLET ORAL 2 TIMES DAILY
Qty: 60 TABLET | Refills: 0 | Status: SHIPPED | OUTPATIENT
Start: 2024-06-25

## 2024-06-25 RX ORDER — LISINOPRIL 5 MG/1
5 TABLET ORAL DAILY
Qty: 90 TABLET | Refills: 1 | Status: SHIPPED | OUTPATIENT
Start: 2024-06-25 | End: 2025-06-25

## 2024-06-25 RX ORDER — NAPROXEN 500 MG/1
500 TABLET ORAL 2 TIMES DAILY PRN
Qty: 30 TABLET | Refills: 0 | Status: SHIPPED | OUTPATIENT
Start: 2024-06-25

## 2024-06-25 RX ORDER — CITALOPRAM 20 MG/1
20 TABLET, FILM COATED ORAL DAILY
Qty: 90 TABLET | Refills: 1 | Status: SHIPPED | OUTPATIENT
Start: 2024-06-25

## 2024-06-25 RX ORDER — DEXTROAMPHETAMINE SACCHARATE, AMPHETAMINE ASPARTATE, DEXTROAMPHETAMINE SULFATE AND AMPHETAMINE SULFATE 7.5; 7.5; 7.5; 7.5 MG/1; MG/1; MG/1; MG/1
30 TABLET ORAL 2 TIMES DAILY
Qty: 60 TABLET | Refills: 0 | Status: SHIPPED | OUTPATIENT
Start: 2024-07-25

## 2024-06-25 RX ORDER — DEXTROAMPHETAMINE SACCHARATE, AMPHETAMINE ASPARTATE, DEXTROAMPHETAMINE SULFATE AND AMPHETAMINE SULFATE 7.5; 7.5; 7.5; 7.5 MG/1; MG/1; MG/1; MG/1
30 TABLET ORAL 2 TIMES DAILY
Qty: 60 TABLET | Refills: 0 | Status: SHIPPED | OUTPATIENT
Start: 2024-08-25

## 2024-06-25 NOTE — PROGRESS NOTES
Subjective:       Patient ID: Priscilla Wall is a 43 y.o. female.    Chief Complaint: Medication Refill    Priscilla Wall is a 43-year-old female patient who presents to clinic for medication refills.  History of hypertension controlled well with lisinopril.  Patient denies chest pain or palpitations.  ADD and doing well on Adderall 30 mg twice daily.  Iron-deficiency anemia but does not take supplements.  Last visit she was complaining of some swelling in her legs off and on.  This is still occurring but not very much.  Mainly when she standing on her feet for long period of time and does resolve with elevation.  Was supposed to have labs with last 2 visits but has not done them.  She is complaining of pain in her   Right soulder pain since April. Was seen at urgent care and told she strained her muscle and was given robaxin which did not help. The pain is not improving.  Shoulder hurts worse when she lifts her arm above her head.  She is wanting to know if PT would help.  There has been no previous imaging done.  LMP last week. Was a week early.  Her menstrual cycle has been coming earlier for a few months.  She recently had an appointment with her gynecologist but had to cancel.  She plans on rescheduling.  She is due for mammogram.  Also due for tetanus vaccine.          Review of patient's allergies indicates:   Allergen Reactions    Sulfa (sulfonamide antibiotics) Anaphylaxis     Social Determinants of Health     Tobacco Use: High Risk (6/25/2024)    Patient History     Smoking Tobacco Use: Every Day     Smokeless Tobacco Use: Never     Passive Exposure: Not on file   Alcohol Use: Not on file   Financial Resource Strain: Not on file   Food Insecurity: Not on file   Transportation Needs: Not on file   Physical Activity: Not on file   Stress: Not on file   Housing Stability: Not on file   Depression: Low Risk  (6/25/2024)    Depression     Last PHQ-4: Flowsheet Data: 0   Utilities: Not on file   Health  Literacy: Not on file   Social Isolation: Not on file      Past Medical History:   Diagnosis Date    ADD (attention deficit disorder)     Depression     Foot fracture, right     Hypertension     Irregular menstrual bleeding 06/2021    MRSA infection 06/02/2017      Past Surgical History:   Procedure Laterality Date    FOOT HARDWARE REMOVAL      INCISION AND DRAINAGE FOOT Right     ORIF 5th metatarsal Right     TUBAL LIGATION Bilateral 2016      Social History     Socioeconomic History    Marital status: Single         Current Outpatient Medications:     diclofenac sodium (VOLTAREN) 1 % Gel, Apply 2 g topically 3 (three) times daily as needed (pain)., Disp: 1 each, Rfl: 0    mupirocin (BACTROBAN) 2 % ointment, Apply topically 3 (three) times daily., Disp: 22 g, Rfl: 0    valACYclovir (VALTREX) 1000 MG tablet, TAKE 0.5 TABLETS (500 MG TOTAL) BY MOUTH ONCE DAILY., Disp: 45 tablet, Rfl: 1    citalopram (CELEXA) 20 MG tablet, Take 1 tablet (20 mg total) by mouth once daily., Disp: 90 tablet, Rfl: 1    dextroamphetamine-amphetamine 30 mg Tab, Take 1 tablet (30 mg total) by mouth 2 (two) times a day., Disp: 60 tablet, Rfl: 0    [START ON 7/25/2024] dextroamphetamine-amphetamine 30 mg Tab, Take 1 tablet (30 mg total) by mouth 2 (two) times a day., Disp: 60 tablet, Rfl: 0    [START ON 8/25/2024] dextroamphetamine-amphetamine 30 mg Tab, Take 1 tablet (30 mg total) by mouth 2 (two) times a day., Disp: 60 tablet, Rfl: 0    lisinopriL (PRINIVIL,ZESTRIL) 5 MG tablet, Take 1 tablet (5 mg total) by mouth once daily., Disp: 90 tablet, Rfl: 1    methocarbamoL (ROBAXIN) 500 MG Tab, Take 1 tablet (500 mg total) by mouth 3 (three) times daily as needed (muscle spasms). (Patient not taking: Reported on 6/25/2024), Disp: 21 tablet, Rfl: 0    naproxen (NAPROSYN) 500 MG tablet, Take 1 tablet (500 mg total) by mouth 2 (two) times daily. (Patient not taking: Reported on 4/18/2024), Disp: 30 tablet, Rfl: 0    naproxen (NAPROSYN) 500 MG tablet,  Take 1 tablet (500 mg total) by mouth 2 (two) times daily as needed (pain)., Disp: 30 tablet, Rfl: 0    Lab Results   Component Value Date    WBC 7.1 02/03/2022    HGB 12.7 02/03/2022    HCT 39.8 02/03/2022     02/03/2022    CHOL 182 02/03/2022    TRIG 157 (H) 02/03/2022    HDL 57 02/03/2022    ALT 10 02/03/2022    AST 12 02/03/2022     02/03/2022    K 4.6 02/03/2022     02/03/2022    CREATININE 0.80 02/03/2022    BUN 11 02/03/2022    CO2 28 02/03/2022    TSH 1.29 02/03/2022    INR 1.0 06/01/2017       Review of Systems   Constitutional: Negative.    HENT: Negative.     Respiratory: Negative.  Negative for shortness of breath.    Cardiovascular: Negative.  Negative for palpitations.   Gastrointestinal: Negative.    Genitourinary: Negative.  Positive for menstrual irregularity.        LMP last week. Has been having cycles every 2-3 weeks. Had appointment with Gynecologist but had to cancel.  Plans to reschedule    Neurological: Negative.    Psychiatric/Behavioral:  Positive for decreased concentration. Negative for suicidal ideas. The patient is not nervous/anxious.        Objective:      Physical Exam  Vitals reviewed.   Constitutional:       Appearance: Normal appearance.   HENT:      Right Ear: Tympanic membrane normal.      Left Ear: Tympanic membrane normal.      Nose: Nose normal.      Mouth/Throat:      Pharynx: Oropharynx is clear.   Eyes:      Conjunctiva/sclera: Conjunctivae normal.   Cardiovascular:      Rate and Rhythm: Normal rate and regular rhythm.      Pulses: Normal pulses.      Heart sounds: Normal heart sounds.   Pulmonary:      Effort: Pulmonary effort is normal.      Breath sounds: Normal breath sounds.   Musculoskeletal:      Right shoulder: Tenderness present. No swelling. Normal range of motion. Normal strength. Normal pulse.   Skin:     General: Skin is warm and dry.      Capillary Refill: Capillary refill takes less than 2 seconds.   Neurological:      General: No focal  deficit present.      Mental Status: She is alert.   Psychiatric:         Mood and Affect: Mood normal.         Behavior: Behavior normal.         Thought Content: Thought content normal.         Judgment: Judgment normal.         Assessment:       1. Hypertension, essential    2. Attention deficit disorder (ADD) without hyperactivity    3. Current moderate episode of major depressive disorder without prior episode    4. Hyperlipidemia, unspecified hyperlipidemia type    5. Iron deficiency anemia due to chronic blood loss    6. Strain of right trapezius muscle, initial encounter    7. Acute pain of right shoulder    8. Encounter for screening mammogram for malignant neoplasm of breast    9. Irregular menstruation        Plan:       Priscilla was seen today for medication refill.    Diagnoses and all orders for this visit:    Hypertension, essential  -     lisinopriL (PRINIVIL,ZESTRIL) 5 MG tablet; Take 1 tablet (5 mg total) by mouth once daily.    Attention deficit disorder (ADD) without hyperactivity  -     dextroamphetamine-amphetamine 30 mg Tab; Take 1 tablet (30 mg total) by mouth 2 (two) times a day.  -     dextroamphetamine-amphetamine 30 mg Tab; Take 1 tablet (30 mg total) by mouth 2 (two) times a day.  -     dextroamphetamine-amphetamine 30 mg Tab; Take 1 tablet (30 mg total) by mouth 2 (two) times a day.    Current moderate episode of major depressive disorder without prior episode  -     citalopram (CELEXA) 20 MG tablet; Take 1 tablet (20 mg total) by mouth once daily.    Hyperlipidemia, unspecified hyperlipidemia type    Iron deficiency anemia due to chronic blood loss    Strain of right trapezius muscle, initial encounter    Acute pain of right shoulder  -     naproxen (NAPROSYN) 500 MG tablet; Take 1 tablet (500 mg total) by mouth 2 (two) times daily as needed (pain).  -     X-ray Shoulder 2 or More Views Right; Future    Encounter for screening mammogram for malignant neoplasm of breast  -     Mammo Digital  Screening Bilat w/ [facility]; Future    Irregular menstruation    Hypertension  - controlled  - continue lisinopril 5 mg    ADD  - adderall refilled x 3 months.  checked    Depression  - doing well  - continue celexa 20 mg daily    Xray of right shoulder today non diagnostic. MRI ordered for further evaluation.  Naproxen for pain.   Schedule mammogram  Have labs done previously ordered.    Schedule appointment with Gynecology  Follow up in 3 months.  We will need labs done prior to next visit..

## 2025-01-09 ENCOUNTER — PATIENT MESSAGE (OUTPATIENT)
Dept: ADMINISTRATIVE | Facility: HOSPITAL | Age: 45
End: 2025-01-09

## 2025-01-09 ENCOUNTER — PATIENT OUTREACH (OUTPATIENT)
Dept: ADMINISTRATIVE | Facility: HOSPITAL | Age: 45
End: 2025-01-09

## 2025-04-03 ENCOUNTER — OFFICE VISIT (OUTPATIENT)
Dept: FAMILY MEDICINE | Facility: CLINIC | Age: 45
End: 2025-04-03
Payer: MEDICAID

## 2025-04-03 VITALS
BODY MASS INDEX: 33.66 KG/M2 | HEIGHT: 65 IN | TEMPERATURE: 97 F | SYSTOLIC BLOOD PRESSURE: 158 MMHG | WEIGHT: 202.06 LBS | DIASTOLIC BLOOD PRESSURE: 98 MMHG | OXYGEN SATURATION: 99 % | HEART RATE: 94 BPM

## 2025-04-03 DIAGNOSIS — Z13.1 SCREENING FOR DIABETES MELLITUS: ICD-10-CM

## 2025-04-03 DIAGNOSIS — I10 HYPERTENSION, ESSENTIAL: ICD-10-CM

## 2025-04-03 DIAGNOSIS — D50.0 IRON DEFICIENCY ANEMIA DUE TO CHRONIC BLOOD LOSS: ICD-10-CM

## 2025-04-03 DIAGNOSIS — R53.83 FATIGUE, UNSPECIFIED TYPE: ICD-10-CM

## 2025-04-03 DIAGNOSIS — F98.8 ATTENTION DEFICIT DISORDER (ADD) WITHOUT HYPERACTIVITY: Primary | ICD-10-CM

## 2025-04-03 DIAGNOSIS — Z12.31 ENCOUNTER FOR SCREENING MAMMOGRAM FOR MALIGNANT NEOPLASM OF BREAST: ICD-10-CM

## 2025-04-03 DIAGNOSIS — F32.1 CURRENT MODERATE EPISODE OF MAJOR DEPRESSIVE DISORDER WITHOUT PRIOR EPISODE: ICD-10-CM

## 2025-04-03 DIAGNOSIS — N32.81 OVERACTIVE BLADDER: ICD-10-CM

## 2025-04-03 DIAGNOSIS — F41.9 ANXIETY: ICD-10-CM

## 2025-04-03 DIAGNOSIS — Z13.220 SCREENING FOR HYPERLIPIDEMIA: ICD-10-CM

## 2025-04-03 PROCEDURE — 3080F DIAST BP >= 90 MM HG: CPT | Mod: CPTII,,,

## 2025-04-03 PROCEDURE — 99213 OFFICE O/P EST LOW 20 MIN: CPT | Mod: PBBFAC,PN

## 2025-04-03 PROCEDURE — 1159F MED LIST DOCD IN RCRD: CPT | Mod: CPTII,,,

## 2025-04-03 PROCEDURE — 99999 PR PBB SHADOW E&M-EST. PATIENT-LVL III: CPT | Mod: PBBFAC,,,

## 2025-04-03 PROCEDURE — 3008F BODY MASS INDEX DOCD: CPT | Mod: CPTII,,,

## 2025-04-03 PROCEDURE — 1160F RVW MEDS BY RX/DR IN RCRD: CPT | Mod: CPTII,,,

## 2025-04-03 PROCEDURE — 99214 OFFICE O/P EST MOD 30 MIN: CPT | Mod: S$PBB,,,

## 2025-04-03 PROCEDURE — 3077F SYST BP >= 140 MM HG: CPT | Mod: CPTII,,,

## 2025-04-03 RX ORDER — DEXTROAMPHETAMINE SACCHARATE, AMPHETAMINE ASPARTATE, DEXTROAMPHETAMINE SULFATE AND AMPHETAMINE SULFATE 7.5; 7.5; 7.5; 7.5 MG/1; MG/1; MG/1; MG/1
30 TABLET ORAL 2 TIMES DAILY
Qty: 60 TABLET | Refills: 0 | Status: SHIPPED | OUTPATIENT
Start: 2025-05-03

## 2025-04-03 RX ORDER — DEXTROAMPHETAMINE SACCHARATE, AMPHETAMINE ASPARTATE, DEXTROAMPHETAMINE SULFATE AND AMPHETAMINE SULFATE 7.5; 7.5; 7.5; 7.5 MG/1; MG/1; MG/1; MG/1
30 TABLET ORAL 2 TIMES DAILY
Qty: 60 TABLET | Refills: 0 | Status: SHIPPED | OUTPATIENT
Start: 2025-06-02

## 2025-04-03 RX ORDER — CITALOPRAM 10 MG/1
10 TABLET ORAL DAILY
Qty: 30 TABLET | Refills: 1 | Status: SHIPPED | OUTPATIENT
Start: 2025-04-03

## 2025-04-03 RX ORDER — LISINOPRIL 5 MG/1
5 TABLET ORAL DAILY
Qty: 90 TABLET | Refills: 1 | Status: SHIPPED | OUTPATIENT
Start: 2025-04-03 | End: 2026-04-03

## 2025-04-03 RX ORDER — DEXTROAMPHETAMINE SACCHARATE, AMPHETAMINE ASPARTATE, DEXTROAMPHETAMINE SULFATE AND AMPHETAMINE SULFATE 7.5; 7.5; 7.5; 7.5 MG/1; MG/1; MG/1; MG/1
30 TABLET ORAL 2 TIMES DAILY
Qty: 60 TABLET | Refills: 0 | Status: SHIPPED | OUTPATIENT
Start: 2025-04-03

## 2025-04-03 RX ORDER — MIRABEGRON 25 MG/1
25 TABLET, FILM COATED, EXTENDED RELEASE ORAL DAILY
Qty: 90 TABLET | Refills: 1 | Status: SHIPPED | OUTPATIENT
Start: 2025-04-03 | End: 2026-04-03

## 2025-04-03 NOTE — PROGRESS NOTES
Subjective:       Patient ID: Priscilla Wall is a 44 y.o. female.    Chief Complaint: Medication Refill    Priscilla Wall 44-year-old female patient who presents to clinic for medication refills.  Patient states she lost her insurance and has been off of all of her medications.  ADD, she was on Adderall 30 mg twice a day.  Depression, was on citalopram 20 mg daily.  She has noticed a difference in herself not having her medication.  She has no thoughts of suicide or homicide.  Hypertension, was previously on lisinopril 5 mg daily.  Her blood pressure is high today at 158/98.  As no chest pain or palpitations.  History of iron-deficiency anemia and is not taking any supplements.  She has been having issues with frequent urination, urgency and incontinence.  She has been urinating frequently at night and sometimes does not make it to the bathroom.  She states her mom has a history of incontinence and wears a pessary.                Review of patient's allergies indicates:   Allergen Reactions    Sulfa (sulfonamide antibiotics) Anaphylaxis     Social Drivers of Health     Tobacco Use: High Risk (4/3/2025)    Patient History     Smoking Tobacco Use: Every Day     Smokeless Tobacco Use: Never     Passive Exposure: Not on file   Alcohol Use: Not on file   Financial Resource Strain: Not on file   Food Insecurity: Not on file   Transportation Needs: Not on file   Physical Activity: Not on file   Stress: Not on file   Housing Stability: Not on file   Depression: Low Risk  (4/3/2025)    Depression     Last PHQ-4: Flowsheet Data: 2   Utilities: Not on file   Health Literacy: Not on file   Social Isolation: Not on file      Past Medical History:   Diagnosis Date    ADD (attention deficit disorder)     Depression     Foot fracture, right     Hypertension     Irregular menstrual bleeding 06/2021    MRSA infection 06/02/2017      Past Surgical History:   Procedure Laterality Date    FOOT HARDWARE REMOVAL      INCISION AND  DRAINAGE FOOT Right     ORIF 5th metatarsal Right     TUBAL LIGATION Bilateral 2016      Social History[1]    Current Medications[2]    Lab Results   Component Value Date    WBC 6.71 07/22/2024    HGB 10.4 (L) 07/22/2024    HCT 32.7 (L) 07/22/2024     07/22/2024    CHOL 182 02/03/2022    TRIG 157 (H) 02/03/2022    HDL 57 02/03/2022    ALT 24 07/22/2024    AST 31 07/22/2024     (L) 07/22/2024    K 3.6 07/22/2024     07/22/2024    CREATININE 0.98 07/22/2024    BUN 12 07/22/2024    CO2 27 07/22/2024    TSH 1.29 02/03/2022    INR 1.0 06/01/2017       Review of Systems   Constitutional:  Positive for fatigue.   HENT: Negative.     Respiratory:  Negative for chest tightness and shortness of breath.    Cardiovascular:  Negative for chest pain, palpitations and leg swelling.   Gastrointestinal: Negative.    Genitourinary:  Positive for bladder incontinence and urgency.        LMP 1 month ago   Neurological: Negative.    Psychiatric/Behavioral:  Positive for decreased concentration and depressed mood. Negative for suicidal ideas. The patient is nervous/anxious.        Objective:      Physical Exam  Vitals reviewed.   Constitutional:       Appearance: Normal appearance.   HENT:      Head: Normocephalic and atraumatic.      Right Ear: Tympanic membrane normal.      Left Ear: Tympanic membrane normal.      Nose: Nose normal.      Mouth/Throat:      Mouth: Mucous membranes are moist.      Pharynx: Oropharynx is clear.   Eyes:      Conjunctiva/sclera: Conjunctivae normal.      Pupils: Pupils are equal, round, and reactive to light.   Neck:      Thyroid: No thyromegaly.   Cardiovascular:      Rate and Rhythm: Normal rate and regular rhythm.      Pulses: Normal pulses.      Heart sounds: Normal heart sounds.   Pulmonary:      Effort: Pulmonary effort is normal.      Breath sounds: Normal breath sounds.   Abdominal:      Palpations: Abdomen is soft.      Tenderness: There is no abdominal tenderness.    Musculoskeletal:         General: Normal range of motion.      Cervical back: Normal range of motion.   Lymphadenopathy:      Cervical: No cervical adenopathy.   Skin:     General: Skin is warm and dry.      Capillary Refill: Capillary refill takes less than 2 seconds.   Neurological:      General: No focal deficit present.      Mental Status: She is alert and oriented to person, place, and time.   Psychiatric:         Mood and Affect: Mood normal.         Behavior: Behavior normal.         Thought Content: Thought content normal.         Judgment: Judgment normal.         Assessment:       1. Attention deficit disorder (ADD) without hyperactivity    2. Anxiety    3. Current moderate episode of major depressive disorder without prior episode    4. Hypertension, essential    5. Iron deficiency anemia due to chronic blood loss    6. Encounter for screening mammogram for malignant neoplasm of breast    7. Overactive bladder    8. Fatigue, unspecified type    9. Screening for diabetes mellitus    10. Screening for hyperlipidemia        Plan:       Priscilla was seen today for medication refill.    Diagnoses and all orders for this visit:    Attention deficit disorder (ADD) without hyperactivity  -     dextroamphetamine-amphetamine 30 mg Tab; Take 1 tablet (30 mg total) by mouth 2 (two) times a day.  -     dextroamphetamine-amphetamine 30 mg Tab; Take 1 tablet (30 mg total) by mouth 2 (two) times a day.  -     dextroamphetamine-amphetamine 30 mg Tab; Take 1 tablet (30 mg total) by mouth 2 (two) times a day.    Anxiety    Current moderate episode of major depressive disorder without prior episode  -     citalopram (CELEXA) 10 MG tablet; Take 1 tablet (10 mg total) by mouth once daily.    Hypertension, essential  -     lisinopriL (PRINIVIL,ZESTRIL) 5 MG tablet; Take 1 tablet (5 mg total) by mouth once daily.  -     CBC Auto Differential; Future  -     Comprehensive Metabolic Panel; Future  -     CBC Auto Differential  -      Comprehensive Metabolic Panel    Iron deficiency anemia due to chronic blood loss  -     Iron and TIBC; Future  -     Ferritin; Future  -     Iron and TIBC  -     Ferritin    Encounter for screening mammogram for malignant neoplasm of breast  -     Mammo Digital Screening Bilat w/ [facility] (XPD); Future    Overactive bladder  -     mirabegron (MYRBETRIQ) 25 mg Tb24 ER tablet; Take 1 tablet (25 mg total) by mouth once daily.    Fatigue, unspecified type  -     TSH; Future  -     TSH    Screening for diabetes mellitus  -     Hemoglobin A1C; Future  -     Hemoglobin A1C    Screening for hyperlipidemia  -     Lipid Panel; Future  -     Lipid Panel    ADD   - Adderall 30 mg b.i.d. refilled x3 months.   checked  - follow-up in 3 months for refill    Anxiety and depression   - start citalopram at lower dose, 10 mg daily  - follow-up in 1 month may increase at that time    Hypertension   - blood pressure elevated today patient has been off of her medication for a month or so  - restart lisinopril 5 mg daily  - follow-up in 1 month to recheck blood pressure    Overactive bladder   - start Myrbetriq 25 mg daily  - discussed pelvic floor therapy patient would prefer to wait on this.    ROCKY  - check labs    Have fasting labs done.  Schedule mammogram.  Due for tetanus COVID vaccinations and have at her pharmacy.  Pap smear is due in May.  Follow-up in 1 month or sooner if needed.    This note was created using [employer] voice recognition software that occasionally misinterprets phrases or words.                     [1]   Social History  Socioeconomic History    Marital status: Single   [2]   Current Outpatient Medications:     methocarbamoL (ROBAXIN) 500 MG Tab, Take 1 tablet (500 mg total) by mouth 3 (three) times daily as needed (muscle spasms)., Disp: 21 tablet, Rfl: 0    mupirocin (BACTROBAN) 2 % ointment, Apply topically 3 (three) times daily., Disp: 22 g, Rfl: 0    valACYclovir (VALTREX) 1000 MG tablet, TAKE 0.5 TABLETS (500  MG TOTAL) BY MOUTH ONCE DAILY., Disp: 45 tablet, Rfl: 1    citalopram (CELEXA) 10 MG tablet, Take 1 tablet (10 mg total) by mouth once daily., Disp: 30 tablet, Rfl: 1    dextroamphetamine-amphetamine 30 mg Tab, Take 1 tablet (30 mg total) by mouth 2 (two) times a day., Disp: 60 tablet, Rfl: 0    [START ON 5/3/2025] dextroamphetamine-amphetamine 30 mg Tab, Take 1 tablet (30 mg total) by mouth 2 (two) times a day., Disp: 60 tablet, Rfl: 0    [START ON 6/2/2025] dextroamphetamine-amphetamine 30 mg Tab, Take 1 tablet (30 mg total) by mouth 2 (two) times a day., Disp: 60 tablet, Rfl: 0    lisinopriL (PRINIVIL,ZESTRIL) 5 MG tablet, Take 1 tablet (5 mg total) by mouth once daily., Disp: 90 tablet, Rfl: 1    mirabegron (MYRBETRIQ) 25 mg Tb24 ER tablet, Take 1 tablet (25 mg total) by mouth once daily., Disp: 90 tablet, Rfl: 1

## 2025-05-02 ENCOUNTER — TELEPHONE (OUTPATIENT)
Dept: FAMILY MEDICINE | Facility: CLINIC | Age: 45
End: 2025-05-02
Payer: MEDICAID

## 2025-05-02 ENCOUNTER — PATIENT MESSAGE (OUTPATIENT)
Dept: ADMINISTRATIVE | Facility: HOSPITAL | Age: 45
End: 2025-05-02
Payer: MEDICAID

## 2025-05-02 ENCOUNTER — RESULTS FOLLOW-UP (OUTPATIENT)
Dept: FAMILY MEDICINE | Facility: CLINIC | Age: 45
End: 2025-05-02
Payer: MEDICAID

## 2025-05-02 DIAGNOSIS — D50.9 IRON DEFICIENCY ANEMIA, UNSPECIFIED IRON DEFICIENCY ANEMIA TYPE: Primary | ICD-10-CM

## 2025-05-02 NOTE — PROGRESS NOTES
Iron deficiency  Take iron 325 mg daily.  [facility]    Triglycerides are high.  I recommend a heart healthy diet rich in fiber, fresh vegetables and fruit and low in saturated fats (fried foods, red meat, etc.).  I also recommend regular exercise.   Decreased carbohydrates and sugars.     Remaining labs look ok. Follow up as recommended.

## 2025-05-02 NOTE — TELEPHONE ENCOUNTER
----- Message from Debi Duque NP sent at 5/2/2025  9:33 AM CDT -----  Iron deficiency  Take iron 325 mg daily.  [facility].    Triglycerides are high.  I recommend a heart healthy diet rich in fiber, fresh vegetables and fruit and low in saturated fats (fried foods, red meat, etc.).  I also recommend regular exercise.     Decreased carbohydrates and sugars.     Remaining labs look ok. Follow up as recommended.   ----- Message -----  From: Kurt Tom  Sent: 5/2/2025   7:11 AM CDT  To: Debi Duque NP

## 2025-05-12 ENCOUNTER — PATIENT MESSAGE (OUTPATIENT)
Dept: FAMILY MEDICINE | Facility: CLINIC | Age: 45
End: 2025-05-12
Payer: MEDICAID

## 2025-05-12 ENCOUNTER — TELEPHONE (OUTPATIENT)
Dept: FAMILY MEDICINE | Facility: CLINIC | Age: 45
End: 2025-05-12
Payer: MEDICAID

## 2025-05-12 NOTE — TELEPHONE ENCOUNTER
Left Voicemail for patient regarding appt today with Terry Uribe, she is currently out the office.

## 2025-05-12 NOTE — TELEPHONE ENCOUNTER
----- Message from [employer] sent at 5/12/2025  1:51 PM CDT -----  Type:  Patient Returning CallWho Called: the patientWho Left Message for Patient: Samuel BRIZUELA the patient know what this is regarding?:yes/noWould the patient rather a call back or a response via [facility]? call back Best Call Back Number:[phone] Additional Information: Thanks

## 2025-05-13 ENCOUNTER — OFFICE VISIT (OUTPATIENT)
Dept: FAMILY MEDICINE | Facility: CLINIC | Age: 45
End: 2025-05-13
Payer: MEDICAID

## 2025-05-13 VITALS
SYSTOLIC BLOOD PRESSURE: 118 MMHG | BODY MASS INDEX: 33.62 KG/M2 | HEART RATE: 82 BPM | DIASTOLIC BLOOD PRESSURE: 88 MMHG | TEMPERATURE: 98 F | HEIGHT: 65 IN | OXYGEN SATURATION: 99 %

## 2025-05-13 DIAGNOSIS — K59.00 CONSTIPATION, UNSPECIFIED CONSTIPATION TYPE: Primary | ICD-10-CM

## 2025-05-13 DIAGNOSIS — F98.8 ATTENTION DEFICIT DISORDER (ADD) WITHOUT HYPERACTIVITY: ICD-10-CM

## 2025-05-13 DIAGNOSIS — F41.9 ANXIETY: ICD-10-CM

## 2025-05-13 DIAGNOSIS — F32.1 CURRENT MODERATE EPISODE OF MAJOR DEPRESSIVE DISORDER WITHOUT PRIOR EPISODE: ICD-10-CM

## 2025-05-13 DIAGNOSIS — I10 HYPERTENSION, ESSENTIAL: ICD-10-CM

## 2025-05-13 PROCEDURE — 3079F DIAST BP 80-89 MM HG: CPT | Mod: CPTII,,, | Performed by: NURSE PRACTITIONER

## 2025-05-13 PROCEDURE — 3074F SYST BP LT 130 MM HG: CPT | Mod: CPTII,,, | Performed by: NURSE PRACTITIONER

## 2025-05-13 PROCEDURE — 4010F ACE/ARB THERAPY RXD/TAKEN: CPT | Mod: CPTII,,, | Performed by: NURSE PRACTITIONER

## 2025-05-13 PROCEDURE — 99999 PR PBB SHADOW E&M-EST. PATIENT-LVL III: CPT | Mod: PBBFAC,,, | Performed by: NURSE PRACTITIONER

## 2025-05-13 PROCEDURE — 3008F BODY MASS INDEX DOCD: CPT | Mod: CPTII,,, | Performed by: NURSE PRACTITIONER

## 2025-05-13 PROCEDURE — 99213 OFFICE O/P EST LOW 20 MIN: CPT | Mod: PBBFAC,PN | Performed by: NURSE PRACTITIONER

## 2025-05-13 PROCEDURE — 99214 OFFICE O/P EST MOD 30 MIN: CPT | Mod: S$PBB,,, | Performed by: NURSE PRACTITIONER

## 2025-05-13 PROCEDURE — 1159F MED LIST DOCD IN RCRD: CPT | Mod: CPTII,,, | Performed by: NURSE PRACTITIONER

## 2025-05-13 PROCEDURE — 3044F HG A1C LEVEL LT 7.0%: CPT | Mod: CPTII,,, | Performed by: NURSE PRACTITIONER

## 2025-05-13 RX ORDER — DEXTROAMPHETAMINE SACCHARATE, AMPHETAMINE ASPARTATE, DEXTROAMPHETAMINE SULFATE AND AMPHETAMINE SULFATE 7.5; 7.5; 7.5; 7.5 MG/1; MG/1; MG/1; MG/1
30 TABLET ORAL 2 TIMES DAILY
Qty: 60 TABLET | Refills: 0 | Status: SHIPPED | OUTPATIENT
Start: 2025-07-13

## 2025-05-13 RX ORDER — CITALOPRAM 20 MG/1
20 TABLET ORAL DAILY
Qty: 90 TABLET | Refills: 1 | Status: SHIPPED | OUTPATIENT
Start: 2025-05-13 | End: 2026-05-13

## 2025-05-13 RX ORDER — DOCUSATE SODIUM 100 MG/1
100 CAPSULE, LIQUID FILLED ORAL 2 TIMES DAILY
Qty: 60 CAPSULE | Refills: 2 | Status: SHIPPED | OUTPATIENT
Start: 2025-05-13

## 2025-05-13 RX ORDER — DEXTROAMPHETAMINE SACCHARATE, AMPHETAMINE ASPARTATE, DEXTROAMPHETAMINE SULFATE AND AMPHETAMINE SULFATE 7.5; 7.5; 7.5; 7.5 MG/1; MG/1; MG/1; MG/1
30 TABLET ORAL 2 TIMES DAILY
Qty: 60 TABLET | Refills: 0 | Status: SHIPPED | OUTPATIENT
Start: 2025-06-13

## 2025-05-13 RX ORDER — DEXTROAMPHETAMINE SACCHARATE, AMPHETAMINE ASPARTATE, DEXTROAMPHETAMINE SULFATE AND AMPHETAMINE SULFATE 7.5; 7.5; 7.5; 7.5 MG/1; MG/1; MG/1; MG/1
30 TABLET ORAL 2 TIMES DAILY
Qty: 60 TABLET | Refills: 0 | Status: SHIPPED | OUTPATIENT
Start: 2025-05-13

## 2025-05-13 RX ORDER — LISINOPRIL 5 MG/1
5 TABLET ORAL DAILY
Qty: 90 TABLET | Refills: 1 | Status: SHIPPED | OUTPATIENT
Start: 2025-05-13 | End: 2026-05-13

## 2025-05-13 NOTE — PROGRESS NOTES
Subjective:       Patient ID: Priscilla Wall is a 44 y.o. female.    Chief Complaint: Follow-up (1 month bp ) and Medication Refill    Follow-up  Pertinent negatives include no chest pain, chills, fever, headaches or myalgias.   Medication Refill  Pertinent negatives include no chest pain, chills, fever, headaches or myalgias.       History of Present Illness    CHIEF COMPLAINT:  Ms. Wall presents today for blood pressure follow up    BLOOD PRESSURE AND MEDICATIONS:  She occasionally checks blood pressure at home but has not done so recently. She continues Lisinopril 5 mg daily. She continues Adderall without palpitations or chest pain. She currently takes Citalopram 10 mg, decreased from 20 mg, with a brief interruption in medication regimen due to insurance issues. She would like to go back up to 20mg citalopram. Currently tolerating the 10mg dose but states mood is not controlled. Denies HI/SI.  BP stable today at 118/88.     LABS:  Iron studies showed low iron levels two weeks ago.    PREVENTIVE HEALTH:  She needs to reschedule her mammogram appointment and is due for a Pap smear.    Pt also needs refill of adderall. Pt presents for follow up on ADHD and refill of ADHD medication.  Pt states medication is still working well. She is taking 30mg bid. Denies any side effects including unintentional weight loss, difficulty sleeping, chest pain or palpitations.  Taking as prescribed. No other concerns.  checked.       Portions of this note were generated with the assistance of ambient listening technology.      ROS:  General: -fever, -chills, -fatigue, -weight gain, -weight loss  Eyes: -vision changes, -redness, -discharge  ENT: -ear pain, -nasal congestion, -sore throat  Cardiovascular: -chest pain, -palpitations, -lower extremity edema  Respiratory: -cough, -shortness of breath  Gastrointestinal: -abdominal pain, -nausea, -vomiting, -diarrhea, -constipation, -blood in stool  Genitourinary: -dysuria,  gait unremarkable, speech unremarkable -hematuria, -frequency  Musculoskeletal: -joint pain, -muscle pain  Skin: -rash, -lesion  Neurological: -headache, -dizziness, -numbness, -tingling  Psychiatric: -anxiety, -depression, -sleep difficulty         Review of patient's allergies indicates:   Allergen Reactions    Sulfa (sulfonamide antibiotics) Anaphylaxis     Social Drivers of Health     Tobacco Use: High Risk (5/13/2025)    Patient History     Smoking Tobacco Use: Every Day     Smokeless Tobacco Use: Never     Passive Exposure: Not on file   Alcohol Use: Not on file   Financial Resource Strain: Not on file   Food Insecurity: Not on file   Transportation Needs: Not on file   Physical Activity: Not on file   Stress: Not on file   Housing Stability: Not on file   Depression: Low Risk  (5/13/2025)    Depression     Last PHQ-4: Flowsheet Data: 0   Utilities: Not on file   Health Literacy: Not on file   Social Isolation: Not on file      Past Medical History:   Diagnosis Date    ADD (attention deficit disorder)     Depression     Foot fracture, right     Hypertension     Irregular menstrual bleeding 06/2021    MRSA infection 06/02/2017      Past Surgical History:   Procedure Laterality Date    FOOT HARDWARE REMOVAL      INCISION AND DRAINAGE FOOT Right     ORIF 5th metatarsal Right     TUBAL LIGATION Bilateral 2016      Social History[1]    Current Medications[2]    Review of Systems   Constitutional:  Negative for activity change, appetite change, chills, fever and unexpected weight change.   Eyes:  Negative for visual disturbance.   Respiratory:  Negative for shortness of breath.    Cardiovascular:  Negative for chest pain, palpitations and leg swelling.   Musculoskeletal:  Negative for myalgias.   Neurological:  Negative for dizziness, light-headedness and headaches.   Psychiatric/Behavioral:  Negative for agitation, behavioral problems and decreased concentration. The patient is not nervous/anxious and is not hyperactive.        Objective:      Vitals:  "   05/13/25 0945 05/13/25 0959   BP: (!) 128/90 118/88   BP Location:  Left arm   Patient Position:  Sitting   Pulse: 82    Temp: 97.5 °F (36.4 °C)    SpO2: 99%    Height: 5' 5" (1.651 m)       Physical Exam  Constitutional:       Appearance: Normal appearance. She is obese.   HENT:      Head: Normocephalic and atraumatic.   Eyes:      Conjunctiva/sclera: Conjunctivae normal.   Neck:      Vascular: No carotid bruit.   Cardiovascular:      Rate and Rhythm: Normal rate and regular rhythm.      Pulses:           Posterior tibial pulses are 2+ on the right side and 2+ on the left side.      Heart sounds: Normal heart sounds, S1 normal and S2 normal.   Pulmonary:      Effort: Pulmonary effort is normal. No respiratory distress.      Breath sounds: Normal breath sounds. No wheezing.   Abdominal:      General: Bowel sounds are normal. There is no distension.      Palpations: Abdomen is soft. There is no mass.      Tenderness: There is no abdominal tenderness.   Musculoskeletal:      Right lower leg: No edema.      Left lower leg: No edema.   Skin:     General: Skin is warm.   Neurological:      Mental Status: She is alert and oriented to person, place, and time.   Psychiatric:         Mood and Affect: Mood normal.         Behavior: Behavior normal.         Assessment:       1. Constipation, unspecified constipation type    2. Attention deficit disorder (ADD) without hyperactivity    3. Hypertension, essential    4. Current moderate episode of major depressive disorder without prior episode    5. Anxiety        Plan:       Priscilla was seen today for follow-up and medication refill.    Diagnoses and all orders for this visit:    Constipation, unspecified constipation type  -     docusate sodium (COLACE) 100 MG capsule; Take 1 capsule (100 mg total) by mouth 2 (two) times daily.    Attention deficit disorder (ADD) without hyperactivity  -     dextroamphetamine-amphetamine 30 mg Tab; Take 1 tablet (30 mg total) by mouth 2 (two) " times a day.  -     dextroamphetamine-amphetamine 30 mg Tab; Take 1 tablet (30 mg total) by mouth 2 (two) times a day.  -     dextroamphetamine-amphetamine 30 mg Tab; Take 1 tablet (30 mg total) by mouth 2 (two) times a day.    Hypertension, essential  -     lisinopriL (PRINIVIL,ZESTRIL) 5 MG tablet; Take 1 tablet (5 mg total) by mouth once daily.    Current moderate episode of major depressive disorder without prior episode    Anxiety  -     citalopram (CELEXA) 20 MG tablet; Take 1 tablet (20 mg total) by mouth once daily.     Assessment & Plan    Portions of this note were generated with the assistance of ambient listening technology.    IMPRESSION:   Assessed BP, noting slight elevation in diastolic reading.   Reviewed medication history.   Increased Citalopram from 10 mg to 20 mg daily based on previous tolerance and current needs.   Considered iron supplementation due to recent low iron lab results.   Evaluated need for mammogram and Pap smear as part of preventive care.    PLAN SUMMARY:  - Increased Citalopram to 20 mg daily  - Continue Lisinopril 5 mg daily  - Prescribed Colace (docusate sodium) as needed  - Continue Adderall at current dosage  - Start OTC Slow Fe iron supplement  - Ordered repeat blood test for iron levels in 2 weeks  - Prescription transmitted electronically to [facility] pharmacy  - Follow-up in 3 months to reassess efficacy of increased Citalopram dosage    HYPERTENSION:  - Blood pressure today was 118/88, showing mild diastolic hypertension.  - Explained proper BP measurement technique, including positioning and timing.  - Continue Lisinopril 5 mg daily for management.    MAJOR DEPRESSIVE DISORDER:  - Ms. Wall was previously on Citalopram 20 mg but currently taking 10 mg.  - Based on current symptoms and previous tolerance, increased Citalopram back to 20 mg daily.  - Follow-up in 3 months to reassess efficacy of the increased dosage.    ATTENTION-DEFICIT HYPERACTIVITY DISORDER:  - Ms.  Mae reports good tolerance to Adderall with no palpitations or chest pain.  - Continue at current dosage.  - Prescription transmitted electronically to [facility] pharmacy.    IRON DEFICIENCY:  - Laboratory tests from 2 weeks ago showed low iron levels.  - Advised patient to start OTC Slow Fe iron supplement.  - Discussed potential constipation as a side effect and prescribed Colace (docusate sodium) as needed for management.  - Ordered repeat blood test for iron levels in 2 weeks to assess response to supplementation.    FOLLOW-UP:  - Informed patient about the availability of in-office Pap smear services.           This note was generated with the assistance of ambient listening technology. Verbal consent was obtained by the patient and accompanying visitor(s) for the recording of patient appointment to facilitate this note. I attest to having reviewed and edited the generated note for accuracy, though some syntax or spelling errors may persist. Please contact the author of this note for any clarification.               [1]   Social History  Socioeconomic History    Marital status: Single   [2]   Current Outpatient Medications:     methocarbamoL (ROBAXIN) 500 MG Tab, Take 1 tablet (500 mg total) by mouth 3 (three) times daily as needed (muscle spasms)., Disp: 21 tablet, Rfl: 0    mirabegron (MYRBETRIQ) 25 mg Tb24 ER tablet, Take 1 tablet (25 mg total) by mouth once daily., Disp: 90 tablet, Rfl: 1    mupirocin (BACTROBAN) 2 % ointment, Apply topically 3 (three) times daily., Disp: 22 g, Rfl: 0    valACYclovir (VALTREX) 1000 MG tablet, TAKE 0.5 TABLETS (500 MG TOTAL) BY MOUTH ONCE DAILY., Disp: 45 tablet, Rfl: 1    citalopram (CELEXA) 20 MG tablet, Take 1 tablet (20 mg total) by mouth once daily., Disp: 90 tablet, Rfl: 1    dextroamphetamine-amphetamine 30 mg Tab, Take 1 tablet (30 mg total) by mouth 2 (two) times a day., Disp: 60 tablet, Rfl: 0    [START ON 6/13/2025] dextroamphetamine-amphetamine 30 mg Tab,  Take 1 tablet (30 mg total) by mouth 2 (two) times a day., Disp: 60 tablet, Rfl: 0    [START ON 7/13/2025] dextroamphetamine-amphetamine 30 mg Tab, Take 1 tablet (30 mg total) by mouth 2 (two) times a day., Disp: 60 tablet, Rfl: 0    docusate sodium (COLACE) 100 MG capsule, Take 1 capsule (100 mg total) by mouth 2 (two) times daily., Disp: 60 capsule, Rfl: 2    lisinopriL (PRINIVIL,ZESTRIL) 5 MG tablet, Take 1 tablet (5 mg total) by mouth once daily., Disp: 90 tablet, Rfl: 1

## 2025-08-13 ENCOUNTER — OFFICE VISIT (OUTPATIENT)
Dept: FAMILY MEDICINE | Facility: CLINIC | Age: 45
End: 2025-08-13
Payer: MEDICAID

## 2025-08-13 VITALS
BODY MASS INDEX: 32.61 KG/M2 | DIASTOLIC BLOOD PRESSURE: 84 MMHG | HEART RATE: 104 BPM | OXYGEN SATURATION: 99 % | HEIGHT: 65 IN | SYSTOLIC BLOOD PRESSURE: 130 MMHG | WEIGHT: 195.75 LBS | TEMPERATURE: 98 F

## 2025-08-13 DIAGNOSIS — K59.00 CONSTIPATION, UNSPECIFIED CONSTIPATION TYPE: ICD-10-CM

## 2025-08-13 DIAGNOSIS — N32.81 OVERACTIVE BLADDER: ICD-10-CM

## 2025-08-13 DIAGNOSIS — D50.9 IRON DEFICIENCY ANEMIA, UNSPECIFIED IRON DEFICIENCY ANEMIA TYPE: ICD-10-CM

## 2025-08-13 DIAGNOSIS — F41.9 ANXIETY: Primary | ICD-10-CM

## 2025-08-13 DIAGNOSIS — I10 HYPERTENSION, ESSENTIAL: ICD-10-CM

## 2025-08-13 DIAGNOSIS — F90.9 ATTENTION DEFICIT HYPERACTIVITY DISORDER (ADHD), UNSPECIFIED ADHD TYPE: Primary | ICD-10-CM

## 2025-08-13 DIAGNOSIS — B35.9 TINEA: ICD-10-CM

## 2025-08-13 DIAGNOSIS — F90.9 ATTENTION DEFICIT HYPERACTIVITY DISORDER (ADHD), UNSPECIFIED ADHD TYPE: ICD-10-CM

## 2025-08-13 PROCEDURE — 3075F SYST BP GE 130 - 139MM HG: CPT | Mod: CPTII,,, | Performed by: NURSE PRACTITIONER

## 2025-08-13 PROCEDURE — 1159F MED LIST DOCD IN RCRD: CPT | Mod: CPTII,,, | Performed by: NURSE PRACTITIONER

## 2025-08-13 PROCEDURE — 3008F BODY MASS INDEX DOCD: CPT | Mod: CPTII,,, | Performed by: NURSE PRACTITIONER

## 2025-08-13 PROCEDURE — 4010F ACE/ARB THERAPY RXD/TAKEN: CPT | Mod: CPTII,,, | Performed by: NURSE PRACTITIONER

## 2025-08-13 PROCEDURE — 99999 PR PBB SHADOW E&M-EST. PATIENT-LVL III: CPT | Mod: PBBFAC,,, | Performed by: NURSE PRACTITIONER

## 2025-08-13 PROCEDURE — 99213 OFFICE O/P EST LOW 20 MIN: CPT | Mod: PBBFAC,PN | Performed by: NURSE PRACTITIONER

## 2025-08-13 PROCEDURE — 3044F HG A1C LEVEL LT 7.0%: CPT | Mod: CPTII,,, | Performed by: NURSE PRACTITIONER

## 2025-08-13 PROCEDURE — 3079F DIAST BP 80-89 MM HG: CPT | Mod: CPTII,,, | Performed by: NURSE PRACTITIONER

## 2025-08-13 PROCEDURE — 99214 OFFICE O/P EST MOD 30 MIN: CPT | Mod: S$PBB,,, | Performed by: NURSE PRACTITIONER

## 2025-08-13 RX ORDER — DEXTROAMPHETAMINE SACCHARATE, AMPHETAMINE ASPARTATE, DEXTROAMPHETAMINE SULFATE AND AMPHETAMINE SULFATE 7.5; 7.5; 7.5; 7.5 MG/1; MG/1; MG/1; MG/1
30 TABLET ORAL 2 TIMES DAILY
Qty: 60 TABLET | Refills: 0 | Status: SHIPPED | OUTPATIENT
Start: 2025-08-13

## 2025-08-13 RX ORDER — KETOCONAZOLE 20 MG/G
CREAM TOPICAL 2 TIMES DAILY
Qty: 15 G | Refills: 0 | Status: SHIPPED | OUTPATIENT
Start: 2025-08-13

## 2025-08-13 RX ORDER — DEXTROAMPHETAMINE SACCHARATE, AMPHETAMINE ASPARTATE, DEXTROAMPHETAMINE SULFATE AND AMPHETAMINE SULFATE 7.5; 7.5; 7.5; 7.5 MG/1; MG/1; MG/1; MG/1
30 TABLET ORAL 2 TIMES DAILY
Qty: 60 TABLET | Refills: 0 | Status: SHIPPED | OUTPATIENT
Start: 2025-09-13 | End: 2025-08-13

## 2025-08-13 RX ORDER — CITALOPRAM 20 MG/1
20 TABLET ORAL DAILY
Qty: 90 TABLET | Refills: 1 | Status: SHIPPED | OUTPATIENT
Start: 2025-08-13 | End: 2026-08-13

## 2025-08-13 RX ORDER — DEXTROAMPHETAMINE SACCHARATE, AMPHETAMINE ASPARTATE, DEXTROAMPHETAMINE SULFATE AND AMPHETAMINE SULFATE 7.5; 7.5; 7.5; 7.5 MG/1; MG/1; MG/1; MG/1
30 TABLET ORAL 2 TIMES DAILY
Qty: 60 TABLET | Refills: 0 | Status: SHIPPED | OUTPATIENT
Start: 2025-08-13 | End: 2025-08-13

## 2025-08-13 RX ORDER — DEXTROAMPHETAMINE SACCHARATE, AMPHETAMINE ASPARTATE, DEXTROAMPHETAMINE SULFATE AND AMPHETAMINE SULFATE 7.5; 7.5; 7.5; 7.5 MG/1; MG/1; MG/1; MG/1
30 TABLET ORAL 2 TIMES DAILY
Qty: 60 TABLET | Refills: 0 | Status: SHIPPED | OUTPATIENT
Start: 2025-08-13 | End: 2025-08-13 | Stop reason: SDUPTHER

## 2025-08-13 RX ORDER — DEXTROAMPHETAMINE SACCHARATE, AMPHETAMINE ASPARTATE, DEXTROAMPHETAMINE SULFATE AND AMPHETAMINE SULFATE 7.5; 7.5; 7.5; 7.5 MG/1; MG/1; MG/1; MG/1
30 TABLET ORAL 2 TIMES DAILY
Qty: 60 TABLET | Refills: 0 | Status: SHIPPED | OUTPATIENT
Start: 2025-10-13

## 2025-08-13 RX ORDER — OXYBUTYNIN CHLORIDE 5 MG/1
5 TABLET, EXTENDED RELEASE ORAL DAILY
Qty: 30 TABLET | Refills: 11 | Status: SHIPPED | OUTPATIENT
Start: 2025-08-13 | End: 2026-08-13

## 2025-08-13 RX ORDER — DEXTROAMPHETAMINE SACCHARATE, AMPHETAMINE ASPARTATE, DEXTROAMPHETAMINE SULFATE AND AMPHETAMINE SULFATE 7.5; 7.5; 7.5; 7.5 MG/1; MG/1; MG/1; MG/1
30 TABLET ORAL 2 TIMES DAILY
Qty: 60 TABLET | Refills: 0 | Status: SHIPPED | OUTPATIENT
Start: 2025-10-13 | End: 2025-08-13 | Stop reason: SDUPTHER

## 2025-08-13 RX ORDER — DEXTROAMPHETAMINE SACCHARATE, AMPHETAMINE ASPARTATE, DEXTROAMPHETAMINE SULFATE AND AMPHETAMINE SULFATE 7.5; 7.5; 7.5; 7.5 MG/1; MG/1; MG/1; MG/1
30 TABLET ORAL 2 TIMES DAILY
Qty: 60 TABLET | Refills: 0 | Status: SHIPPED | OUTPATIENT
Start: 2025-10-13 | End: 2025-08-13

## 2025-08-13 RX ORDER — DEXTROAMPHETAMINE SACCHARATE, AMPHETAMINE ASPARTATE, DEXTROAMPHETAMINE SULFATE AND AMPHETAMINE SULFATE 7.5; 7.5; 7.5; 7.5 MG/1; MG/1; MG/1; MG/1
30 TABLET ORAL 2 TIMES DAILY
Qty: 60 TABLET | Refills: 0 | Status: SHIPPED | OUTPATIENT
Start: 2025-09-13 | End: 2025-08-13 | Stop reason: SDUPTHER

## 2025-08-13 RX ORDER — LISINOPRIL 5 MG/1
5 TABLET ORAL DAILY
Qty: 90 TABLET | Refills: 1 | Status: SHIPPED | OUTPATIENT
Start: 2025-08-13 | End: 2026-08-13

## 2025-08-13 RX ORDER — DEXTROAMPHETAMINE SACCHARATE, AMPHETAMINE ASPARTATE, DEXTROAMPHETAMINE SULFATE AND AMPHETAMINE SULFATE 7.5; 7.5; 7.5; 7.5 MG/1; MG/1; MG/1; MG/1
30 TABLET ORAL 2 TIMES DAILY
Qty: 60 TABLET | Refills: 0 | Status: SHIPPED | OUTPATIENT
Start: 2025-09-13

## 2025-08-13 RX ORDER — DOCUSATE SODIUM 100 MG/1
100 CAPSULE, LIQUID FILLED ORAL 2 TIMES DAILY
Qty: 60 CAPSULE | Refills: 2 | Status: SHIPPED | OUTPATIENT
Start: 2025-08-13

## (undated) DEVICE — ALCOHOL 70% ISOP RUBBING 4OZ

## (undated) DEVICE — Device

## (undated) DEVICE — SEE MEDLINE ITEM 157166

## (undated) DEVICE — SEE MEDLINE ITEM 157117

## (undated) DEVICE — CUFF TOURNIQUET 34 SINGLE"

## (undated) DEVICE — MARKER SKIN STND TIP BLUE BARR

## (undated) DEVICE — GLOVE BIOGEL ULTRATOUCH 6.5

## (undated) DEVICE — SUT ETHILON 3-0 PS2 18 BLK

## (undated) DEVICE — DRESSING XEROFORM 1X8IN

## (undated) DEVICE — DRAPE C ARM 42 X 120 10/BX

## (undated) DEVICE — UNDERGLOVE BIOGEL PI SZ 6.5 LF

## (undated) DEVICE — PADDING CAST 4IN SPECIALIST

## (undated) DEVICE — DRAPE EXTREMITY W/ABC NON-SLIP

## (undated) DEVICE — SEE MEDLINE ITEM 146270

## (undated) DEVICE — SEE MEDLINE ITEM 146292

## (undated) DEVICE — SUT MONOCRYL 3-0 SH U/D

## (undated) DEVICE — DURAPREP SURG SCRUB 26ML

## (undated) DEVICE — ELECTRODE REM PLYHSV RETURN 9

## (undated) DEVICE — SUT MONOCRYL 2-0 S UND

## (undated) DEVICE — GAUZE SPONGE 4X4 12PLY

## (undated) DEVICE — GAUZE SPONGE 8X4 12 PLY